# Patient Record
Sex: FEMALE | Race: WHITE
[De-identification: names, ages, dates, MRNs, and addresses within clinical notes are randomized per-mention and may not be internally consistent; named-entity substitution may affect disease eponyms.]

---

## 2018-02-06 ENCOUNTER — HOSPITAL ENCOUNTER (INPATIENT)
Dept: HOSPITAL 92 - L&D | Age: 33
LOS: 1 days | Discharge: HOME | End: 2018-02-07
Attending: FAMILY MEDICINE | Admitting: FAMILY MEDICINE
Payer: COMMERCIAL

## 2018-02-06 VITALS — BODY MASS INDEX: 29.5 KG/M2

## 2018-02-06 DIAGNOSIS — M79.651: ICD-10-CM

## 2018-02-06 DIAGNOSIS — Z3A.39: ICD-10-CM

## 2018-02-06 DIAGNOSIS — M53.3: ICD-10-CM

## 2018-02-06 DIAGNOSIS — M25.551: ICD-10-CM

## 2018-02-06 DIAGNOSIS — O99.89: Primary | ICD-10-CM

## 2018-02-06 LAB
HBSAG INDEX: 0.22 S/CO (ref 0–0.99)
HGB BLD-MCNC: 11.7 G/DL (ref 12–16)
MCH RBC QN AUTO: 30.6 PG (ref 27–31)
MCV RBC AUTO: 95.4 FL (ref 81–99)
PLATELET # BLD AUTO: 173 THOU/UL (ref 130–400)
RBC # BLD AUTO: 3.82 MILL/UL (ref 4.2–5.4)
SYPHILIS ANTIBODY INDEX: 0.06 S/CO
WBC # BLD AUTO: 9.9 THOU/UL (ref 4.8–10.8)

## 2018-02-06 PROCEDURE — 10907ZC DRAINAGE OF AMNIOTIC FLUID, THERAPEUTIC FROM PRODUCTS OF CONCEPTION, VIA NATURAL OR ARTIFICIAL OPENING: ICD-10-PCS | Performed by: FAMILY MEDICINE

## 2018-02-06 PROCEDURE — 86780 TREPONEMA PALLIDUM: CPT

## 2018-02-06 PROCEDURE — 85027 COMPLETE CBC AUTOMATED: CPT

## 2018-02-06 PROCEDURE — S0020 INJECTION, BUPIVICAINE HYDRO: HCPCS

## 2018-02-06 PROCEDURE — 0HQ9XZZ REPAIR PERINEUM SKIN, EXTERNAL APPROACH: ICD-10-PCS | Performed by: FAMILY MEDICINE

## 2018-02-06 PROCEDURE — 51702 INSERT TEMP BLADDER CATH: CPT

## 2018-02-06 PROCEDURE — 87340 HEPATITIS B SURFACE AG IA: CPT

## 2018-02-06 PROCEDURE — 36415 COLL VENOUS BLD VENIPUNCTURE: CPT

## 2018-02-06 RX ADMIN — DOCUSATE CALCIUM SCH MG: 240 CAPSULE, LIQUID FILLED ORAL at 23:32

## 2018-02-07 VITALS — SYSTOLIC BLOOD PRESSURE: 146 MMHG | DIASTOLIC BLOOD PRESSURE: 91 MMHG

## 2018-02-07 VITALS — TEMPERATURE: 97.8 F

## 2018-02-07 RX ADMIN — DOCUSATE CALCIUM SCH MG: 240 CAPSULE, LIQUID FILLED ORAL at 09:23

## 2018-02-07 RX ADMIN — DOCUSATE CALCIUM SCH MG: 240 CAPSULE, LIQUID FILLED ORAL at 21:08

## 2018-02-07 RX ADMIN — HYDROCODONE BITARTRATE AND ACETAMINOPHEN PRN TAB: 5; 325 TABLET ORAL at 02:48

## 2018-02-07 RX ADMIN — HYDROCODONE BITARTRATE AND ACETAMINOPHEN PRN TAB: 5; 325 TABLET ORAL at 09:22

## 2018-02-07 RX ADMIN — HYDROCODONE BITARTRATE AND ACETAMINOPHEN PRN TAB: 5; 325 TABLET ORAL at 21:08

## 2018-02-07 NOTE — OP
DATE OF PROCEDURE:  02/06/2018

 

PREOPERATIVE DIAGNOSES:

1.  Term intrauterine pregnancy.

2.  Severe right hip and thigh pain and induction of labor at term.

 

POSTOPERATIVE DIAGNOSES:

1.  Term intrauterine pregnancy.

2.  Severe right hip and thigh pain and induction of labor at term.

3.  First degree perineal laceration.

 

PROCEDURE PERFORMED:  Normal spontaneous vaginal delivery and laceration repair.

 

SURGEON:  Blank Mckenna M.D.

 

ANESTHESIA:  Epidural.

 

ESTIMATED BLOOD LOSS:  250 mL.

 

BRIEF DELIVERY SUMMARY:  This is a 32-year-old G4, P2-0-2-2 at 39 and 4/7th weeks gestation who prese
nted for induction of labor at term secondary to severe right hip and thigh joint pain.  She arrived 
on Labor and Delivery and was given an epidural, which alleviated her hip and back pain.  After that 
time, we performed artificial rupture of membranes.  At the time of admission, her cervical exam was 
3 to 4 cm, 70% effaced and -1 station.  She progressed quickly to complete and pushing over 3 hours a
nd she delivered a live female infant, head OA.  Mouth and nares were bulb suctioned at the perineum.
  There was a nuchal cord x1, which was easily reduced prior to delivery of the shoulders.  Shoulders
 and body quickly and easily followed and the infant was placed on mother's abdomen.  Infant Apgars w
ere 8 at 1 minute and 9 at 5 minutes.  The umbilical cord was doubly clamped and cut and cord blood w
as collected and sent for analysis.  The placenta delivered spontaneously and intact with a 3-vessel 
umbilical cord.  Uterine fundus was firm following evacuation of the placenta.  Dutta catheter was re
placed.  There was a first degree perineal laceration, which was repaired in standard running fashion
 using 2-0 Vicryl suture under epidural anesthesia with excellent hemostasis.  Mom and baby were left
 with the nurse in excellent condition attempting to breast feed.

## 2018-02-11 ENCOUNTER — HOSPITAL ENCOUNTER (INPATIENT)
Dept: HOSPITAL 92 - ERS | Age: 33
LOS: 4 days | Discharge: HOME | DRG: 776 | End: 2018-02-15
Attending: FAMILY MEDICINE | Admitting: FAMILY MEDICINE
Payer: COMMERCIAL

## 2018-02-11 VITALS — BODY MASS INDEX: 28 KG/M2

## 2018-02-11 DIAGNOSIS — C79.51: ICD-10-CM

## 2018-02-11 DIAGNOSIS — E83.52: ICD-10-CM

## 2018-02-11 DIAGNOSIS — C77.2: ICD-10-CM

## 2018-02-11 DIAGNOSIS — R18.8: ICD-10-CM

## 2018-02-11 DIAGNOSIS — E87.1: ICD-10-CM

## 2018-02-11 DIAGNOSIS — C50.412: ICD-10-CM

## 2018-02-11 DIAGNOSIS — C78.7: ICD-10-CM

## 2018-02-11 LAB
ALBUMIN SERPL BCG-MCNC: 2.6 G/DL (ref 3.5–5)
ALP SERPL-CCNC: 669 U/L (ref 40–150)
ALT SERPL W P-5'-P-CCNC: 103 U/L (ref 8–55)
ANION GAP SERPL CALC-SCNC: 16 MMOL/L (ref 10–20)
APAP SERPL-MCNC: (no result) MCG/ML (ref 10–30)
AST SERPL-CCNC: 361 U/L (ref 5–34)
BASOPHILS # BLD AUTO: 0.1 THOU/UL (ref 0–0.2)
BASOPHILS NFR BLD AUTO: 0.8 % (ref 0–1)
BILIRUB SERPL-MCNC: 1.5 MG/DL (ref 0.2–1.2)
BUN SERPL-MCNC: 11 MG/DL (ref 7–18.7)
CALCIUM SERPL-MCNC: 11.2 MG/DL (ref 7.8–10.44)
CHLORIDE SERPL-SCNC: 102 MMOL/L (ref 98–107)
CO2 SERPL-SCNC: 21 MMOL/L (ref 22–29)
CREAT CL PREDICTED SERPL C-G-VRATE: 0 ML/MIN (ref 70–130)
DRUG SCREEN CUTOFF: (no result)
EOSINOPHIL # BLD AUTO: 0 THOU/UL (ref 0–0.7)
EOSINOPHIL NFR BLD AUTO: 0.4 % (ref 0–10)
GLOBULIN SER CALC-MCNC: 3.5 G/DL (ref 2.4–3.5)
GLUCOSE SERPL-MCNC: 87 MG/DL (ref 70–105)
HGB BLD-MCNC: 13.3 G/DL (ref 12–16)
LYMPHOCYTES # BLD: 1.4 THOU/UL (ref 1.2–3.4)
LYMPHOCYTES NFR BLD AUTO: 13.7 % (ref 21–51)
MCH RBC QN AUTO: 30.6 PG (ref 27–31)
MCV RBC AUTO: 95.6 FL (ref 81–99)
MEDTOX CONTROL LINE VALID?: (no result)
MEDTOX READER #: (no result)
MONOCYTES # BLD AUTO: 0.6 THOU/UL (ref 0.11–0.59)
MONOCYTES NFR BLD AUTO: 5.7 % (ref 0–10)
NEUTROPHILS # BLD AUTO: 8.2 THOU/UL (ref 1.4–6.5)
NEUTROPHILS NFR BLD AUTO: 79.5 % (ref 42–75)
PLATELET # BLD AUTO: 202 THOU/UL (ref 130–400)
POTASSIUM SERPL-SCNC: 4.1 MMOL/L (ref 3.5–5.1)
RBC # BLD AUTO: 4.34 MILL/UL (ref 4.2–5.4)
SALICYLATES SERPL-MCNC: (no result) MG/DL (ref 15–30)
SODIUM SERPL-SCNC: 135 MMOL/L (ref 136–145)
SP GR UR STRIP: 1.01 (ref 1–1.04)
WBC # BLD AUTO: 10.3 THOU/UL (ref 4.8–10.8)

## 2018-02-11 PROCEDURE — 96361 HYDRATE IV INFUSION ADD-ON: CPT

## 2018-02-11 PROCEDURE — 93970 EXTREMITY STUDY: CPT

## 2018-02-11 PROCEDURE — 51702 INSERT TEMP BLADDER CATH: CPT

## 2018-02-11 PROCEDURE — 85610 PROTHROMBIN TIME: CPT

## 2018-02-11 PROCEDURE — 72170 X-RAY EXAM OF PELVIS: CPT

## 2018-02-11 PROCEDURE — 96365 THER/PROPH/DIAG IV INF INIT: CPT

## 2018-02-11 PROCEDURE — 72100 X-RAY EXAM L-S SPINE 2/3 VWS: CPT

## 2018-02-11 PROCEDURE — 77290 THER RAD SIMULAJ FIELD CPLX: CPT

## 2018-02-11 PROCEDURE — 88331 PATH CONSLTJ SURG 1 BLK 1SPC: CPT

## 2018-02-11 PROCEDURE — 77014: CPT

## 2018-02-11 PROCEDURE — 72158 MRI LUMBAR SPINE W/O & W/DYE: CPT

## 2018-02-11 PROCEDURE — 78306 BONE IMAGING WHOLE BODY: CPT

## 2018-02-11 PROCEDURE — 88341 IMHCHEM/IMCYTCHM EA ADD ANTB: CPT

## 2018-02-11 PROCEDURE — 80307 DRUG TEST PRSMV CHEM ANLYZR: CPT

## 2018-02-11 PROCEDURE — 88342 IMHCHEM/IMCYTCHM 1ST ANTB: CPT

## 2018-02-11 PROCEDURE — 70552 MRI BRAIN STEM W/DYE: CPT

## 2018-02-11 PROCEDURE — 93306 TTE W/DOPPLER COMPLETE: CPT

## 2018-02-11 PROCEDURE — 72197 MRI PELVIS W/O & W/DYE: CPT

## 2018-02-11 PROCEDURE — 80053 COMPREHEN METABOLIC PANEL: CPT

## 2018-02-11 PROCEDURE — 71260 CT THORAX DX C+: CPT

## 2018-02-11 PROCEDURE — 83735 ASSAY OF MAGNESIUM: CPT

## 2018-02-11 PROCEDURE — 81003 URINALYSIS AUTO W/O SCOPE: CPT

## 2018-02-11 PROCEDURE — 93005 ELECTROCARDIOGRAM TRACING: CPT

## 2018-02-11 PROCEDURE — 70551 MRI BRAIN STEM W/O DYE: CPT

## 2018-02-11 PROCEDURE — A4216 STERILE WATER/SALINE, 10 ML: HCPCS

## 2018-02-11 PROCEDURE — 85730 THROMBOPLASTIN TIME PARTIAL: CPT

## 2018-02-11 PROCEDURE — 94760 N-INVAS EAR/PLS OXIMETRY 1: CPT

## 2018-02-11 PROCEDURE — 36415 COLL VENOUS BLD VENIPUNCTURE: CPT

## 2018-02-11 PROCEDURE — 77334 RADIATION TREATMENT AID(S): CPT

## 2018-02-11 PROCEDURE — 74177 CT ABD & PELVIS W/CONTRAST: CPT

## 2018-02-11 PROCEDURE — 77307 TELETHX ISODOSE PLAN CPLX: CPT

## 2018-02-11 PROCEDURE — 88334 PATH CONSLTJ SURG CYTO XM EA: CPT

## 2018-02-11 PROCEDURE — 88305 TISSUE EXAM BY PATHOLOGIST: CPT

## 2018-02-11 PROCEDURE — 70450 CT HEAD/BRAIN W/O DYE: CPT

## 2018-02-11 PROCEDURE — 85025 COMPLETE CBC W/AUTO DIFF WBC: CPT

## 2018-02-11 PROCEDURE — 80306 DRUG TEST PRSMV INSTRMNT: CPT

## 2018-02-11 PROCEDURE — A9503 TC99M MEDRONATE: HCPCS

## 2018-02-11 RX ADMIN — MAGNESIUM SULFATE HEPTAHYDRATE SCH MLS: 40 INJECTION, SOLUTION INTRAVENOUS at 15:40

## 2018-02-11 NOTE — PDOC.EVN
Event Note





- Event Note


Event Note: 


@1630: MRI compatible with PRES. BP better after IV labetolol

## 2018-02-11 NOTE — PDOC.LDHP
Labor and Delivery H&P


Chief complaint: other (5 days postpartum seizure activity)


HPI: 


33 yo white   X 3, last one was 5 days ago with Dr Mckenna, SAB x2 (one D&C

) here for seizure witnessed by EMS. Given Versed 5mg x 1. In ER now bed4. I 

have seen the patient (slightly post-ictal)and interviewed the  at 

bedside. Dr Mckenna aware of patient. No meds except hydrocodone for hip pain.





Allergies: NONE





Surg: D&C X1





ROS: not available due to patient sedation





BP now 130/100





Hread CT done...MRI pending





Labs pending (see event note from 20 minutes prior)


Grav: 5


Para: 3 (SAB 2)


Current pregnancy complications: none


Allergies/Adverse Reactions: 


 Allergies











Allergy/AdvReac Type Severity Reaction Status Date / Time


 


No Known Drug Allergies Allergy   Verified 09/11/15 23:06














- Physical Exam


Abnormal vital signs: 130/100





- Plan


Plan: admit to L&D, magnesium for seizure prophylaxis (We will continue workup 

in ED. Once MRI done, transfer to L&D. Mag 6gram load in use right now. Working 

DX is Eclampsia. Plan D/W ER team.)

## 2018-02-11 NOTE — PDOC.EVN
Event Note





- Event Note


Event Note: 


2330: Bed check: Mag in use. Patient resting, no new issues. BPs 120/80s. Has 

not required any further labetolol.

## 2018-02-11 NOTE — PDOC.EVN
Event Note





- Event Note


Event Note: 


L&D @ 1540:





Patient now in L&D with a HA. IV mag in use. SCDs on for VTE prophylaxis. I 

have just ordered 10mg IN labetolol for BP lowering. CT with possible PRES 

findings vs other. MRI done and report pending. Pain meds prn. 





Labs with normal CR, normal BCB.  and ...supports findings of 

ECLAMPSIA.

## 2018-02-11 NOTE — MRI
MRI BRAIN NONCONTRAST:

 

DATE:

2-11-18

 

HISTORY:

32-year-old post-partum female with seizures. 

 

COMPARISON:

None.

 

FINDINGS:

There are multifocal small patchy T2 hyperintensities consistent with edema scattered in the bilatera
l occipital lobes, upper paramedian parietal lobes, and upper frontal lobes, involving cortex, subcor
tical white matter, and deep white matter. Some of the lesions involve the corpus callosum, including
 several tiny ones in the body, and a moderate sized one in the splenium of the corpus callosum, cent
ered to the right of midline. Several are present in the corona radiata and centrum semiovale. Many m
ore lesions are visible on the MRI compared to the CT. None of these have restricted diffusion or hem
orrhage. The ventricles are normal in size and configuration. There is no mass effect, midline shift,
 or extraaxial fluid collection. 

 

IMPRESSION:

1. Numerous small patchy foci of edema in the bilateral cerebral brain parenchyma. 

2. Given the recent post-partum status, this probably represents PRES (posterior reversible encephalo
deyvi syndrome). 

3. Follow up is recommended. 

 

 

MADAY BENAVIDES

 

POS: [facility]

## 2018-02-11 NOTE — CT
NONCONTRAST CT HEAD:

 

Date: 2-11-18

 

History: Post-partum four days ago, patient had seizure for 34 minutes. 

 

Comparison: None available. 

 

FINDINGS: 

There are low density areas seen in a parafalcine location in the supraventricular region with sugges
tion of similar density area within the corpus callosum posteriorly. There is a questioned low densit
y area seen posteriorly within the right parietal occipital region as well. Exact etiology for these 
areas of decreased attenuation are uncertain. Findings could potentially be related to posterior reve
rsible encephalopathy syndrome (PRES). Areas of infarction related to an embolic phenomenon is a poss
ibility. 

 

There is no mass effect or midline shift. No intraparenchymal or extraaxial hemorrhage is seen. The v
entricular system is normal in size, shape, and position. 

 

The paranasal sinuses and mastoid air cells are clear. Calvarial structures are intact. 

 

IMPRESSION: 

1. Multifocal low density areas seen in a paramedian/parafalcine supraventricular location, more prom
inent on the right with low density area also seen within the posterior right parietooccipital lobe. 
There are questionable low density areas in each bifrontal lobe, but these areas could potentially be
 related to volume averaging with a sulcus. Ffindings may be related to posterior reversible encephal
opathy syndrome (PRES) as stated above, versus embolic phenomenon with small areas of infarction. 

2. MRI of brain is recommended for further evaluation with and without IV contrast. These findings we
re discussed with Dr. Nino in the Emergency Department by Dr. Rendon on 2-11-18 at 1311 hours.  

 

POS: [facility]

## 2018-02-11 NOTE — PDOC.EVN
Event Note





- Event Note


Event Note: 


ER call  to em at 1250:





I just received a call from the PA in the ER. She has assumed care of Ms Moran 

who just arrived to the ED. Per EMS, she had a "seizure" and just arrived to 

our ER. She delivered 5 days ago by Dr Mckenna, controlled , which was induced 

for chronic hip pain but not HTN. She does not report any HA or visual changes 

in these last few days since delivery. Her BP in ER was 140/90s, but not 

severe. 





I have requested she get 6 grams IV Mag then 2 grams per hour. She needs CMP/CBC

/Urine protien.





 Due to atypical presentation, I have requested a head CT to rule out any other 

intracranial issues. Patient received versed 5mg per EMS.





 I have notified Dr Mckenna and am awaiting her response. Will bring up to L&D 

when the ER eval is complete. Working DX: 5 days postpartum, eclampsia.

## 2018-02-12 LAB
ALBUMIN SERPL BCG-MCNC: 2.6 G/DL (ref 3.5–5)
ALP SERPL-CCNC: 736 U/L (ref 40–150)
ALT SERPL W P-5'-P-CCNC: 122 U/L (ref 8–55)
ANION GAP SERPL CALC-SCNC: 15 MMOL/L (ref 10–20)
AST SERPL-CCNC: 437 U/L (ref 5–34)
BILIRUB SERPL-MCNC: 1.5 MG/DL (ref 0.2–1.2)
BUN SERPL-MCNC: 11 MG/DL (ref 7–18.7)
CALCIUM SERPL-MCNC: 9.5 MG/DL (ref 7.8–10.44)
CHLORIDE SERPL-SCNC: 103 MMOL/L (ref 98–107)
CO2 SERPL-SCNC: 22 MMOL/L (ref 22–29)
CREAT CL PREDICTED SERPL C-G-VRATE: 145 ML/MIN (ref 70–130)
GLOBULIN SER CALC-MCNC: 3.4 G/DL (ref 2.4–3.5)
GLUCOSE SERPL-MCNC: 69 MG/DL (ref 70–105)
POTASSIUM SERPL-SCNC: 4.5 MMOL/L (ref 3.5–5.1)
SODIUM SERPL-SCNC: 135 MMOL/L (ref 136–145)

## 2018-02-12 RX ADMIN — MAGNESIUM SULFATE HEPTAHYDRATE SCH MLS: 40 INJECTION, SOLUTION INTRAVENOUS at 02:08

## 2018-02-12 RX ADMIN — MAGNESIUM SULFATE HEPTAHYDRATE SCH MLS: 40 INJECTION, SOLUTION INTRAVENOUS at 11:41

## 2018-02-12 RX ADMIN — DOCUSATE CALCIUM SCH: 240 CAPSULE, LIQUID FILLED ORAL at 23:17

## 2018-02-12 NOTE — PDOC.EVN
Event Note





- Event Note


Event Note: 


L&D check: Patient seen at 0640 at bedside:


Doing well, no HA or other issues





BPs 120/80s, good UOP


Mag in use until 1330 or so


Repeat CMP with persistent elevated LFTS: , 





Left breast mild mastitis: on Diclox 500mg po QID





A/P: S/P eclampsia with residual elevated LFTs. Continue MG until 1330 today, 

good diuresis and no edema. Dicolx for breast. Lactation consultant shauna done 

yesterday.

## 2018-02-12 NOTE — PDOC.EVN
Event Note





- Event Note


Event Note: 


@0300: left breast erythema...breastfeeding. No fever...will begin 

Dicloxacillin for presumed early mastitis.

## 2018-02-12 NOTE — RAD
AP PELVIS:

 

Date: 2-12-18 

 

History: Low back pain, right hip pain. 

 

FINDINGS: 

There is no evidence of a fracture or dislocation. There is suggestion of lucency involving the left 
inferior pubic ramus with expansion of pubic ramus which is asymmetric compared to the right inferior
 pubic ramus. This may be developmental in origin in the lucency related to overlying densities, a le
kathy in this region cannot be entirely excluded. No other lytic or sclerotic osseous lesions are seen
 involving the pelvis. Minimal degenerative change involving the pubic symphysis. Phleboliths overlie
 the pelvis. 

 

IMPRESSION: 

1. Asymmetry in the left inferior pubic ramus compared to the right with question of expansion and archie
cency within the left inferior pubic ramus. This may be projectional, but further evaluation is recom
mended to exclude the possibility of a lesion in this region. Dedicated views of the left hip are sug
gested. 

 

Code T

 

POS: [facility]

## 2018-02-12 NOTE — PRG
DATE OF SERVICE:  02/12/2018

 

SUBJECTIVE:  The patient continues to complain of right low back and hip pain radiating into the thig
h down to about the level of the knee and this is only modestly improved from delivery.  Furthermore,
 she is complaining of mental fog and feeling very drowsy.  She denies any headache and states that s
he does feel somewhat better than yesterday when she presented following an eclamptic seizure.

 

OBJECTIVE:

VITAL SIGNS:  Blood pressures are 130s to 140s over 90s, pulse is in the 80s, respirations are 16, an
d O2 sat is 96% on room air.

GENERAL:  This is a well-developed, well-nourished female in no apparent distress, although she does 
appear moderately uncomfortable in the bed.

HEENT:  Unremarkable.

HEART:  Regular rate and rhythm with no murmurs.

LUNGS:  Clear to auscultation bilaterally.

ABDOMEN:  Soft, nontender with normoactive bowel sounds.  There is a Dutta catheter in place draining
 clear urine.

EXTREMITIES:  Show no clubbing, cyanosis, or edema.

NEUROLOGIC:  Nonfocal, reflexes are 2+ throughout.

 

LABORATORY DATA:  CBC done on admission was normal with a platelet count of 202.  She did have mildly
 elevated LFTs yesterday.  These continue to be elevated with an AST of 437 and an ALT of 122.  Compr
ehensive metabolic panel is otherwise normal today.  Urinalysis is negative for protein.  Urine toxic
ology was negative.  MRI of the brain showed reversible encephalopathy related to elevated blood pres
sure.  This supports the idea eclampsia as the diagnosis.

 

ASSESSMENT AND PLAN:  This is a 32-year-old G5, P3-0-2-3, 6 days postpartum who presented with an ecl
amptic seizure.

1.  Eclampsia.  She was started on magnesium at 1540 hours on 02/11/2018, we will continue this for 2
4 hours.  She has had good urine output and improvement in her blood pressure.  She did receive 1 dos
e of labetalol 10 mg IV yesterday at 1545; however, she has not required any additional blood pressur
e lowering medications.  We did discuss that she would probably need to go home on some kind of oral 
blood pressure medication, although exactly what that will be remains to be seen.  Looking back at he
r last admission when she was present for delivery, her pressures were largely normal.  She did have 
one significantly elevated pressure just prior to discharge that I was not aware of, this was noted b
y the nurse that the patient was in pain.  She was given pain medication and the blood pressure came 
down substantially, so the nurse felt that she could be discharged under my orders.  Looking back, it
 is possible that was an early sign of what was to come, but again it responded really well to just m
anagement of her pain.

2.  Significant back pain and leg pain.  Dr. Huang has actually seen the patient, both outpatient and
 during this hospital stay.  X-rays have been ordered to look at the low back, the pelvis and the rig
ht hip.  If those are normal, the next step would be MRI of that area.  For now, we will just continu
e with narcotic pain relief, which does seem to be taking the edge off.

3.  Breast redness.  It is unclear if this represents true mastitis; however, the patient was started
 on dicloxacillin by the OB Hospitalist and we will continue that for the time being.  She does not h
ave a fever or any other signs of acute infection and just some redness and slight uncomfortable feel
ing in both breasts.  She is also pumping breast milk for her infant and the infant will come visit h
er today as well.

4.  We will repeat her comprehensive metabolic panel tomorrow to watch the trend of her LFTs.

5.  Disposition should be home within a couple of days provided we can keep her blood pressure under 
good control.

## 2018-02-12 NOTE — CON
DATE OF CONSULTATION:  02/12/2018

 

ATTENDING PHYSICIAN:  Dr. Blank Mckenna

 

HISTORY OF PRESENT ILLNESS:  The patient is a 32-year-old white female who just delivered her third c
hild last week.  I saw her a few weeks ago with a several week history of progressive pain in her rig
ht buttock and right hip area which was thought to be secondary to pregnancy and perhaps pressure on 
her lumbosacral plexus.  She was treated with rest, Medrol Dosepak, and p.r.n. hydrocodone without mu
ch improvement.  It was felt that this would resolve after delivery, but she has continued to have sy
mptoms.  Over this past weekend she noticed some numbness on the sole of the right foot.  Yesterday s
he had 2 witnessed seizures, which  has been thought to be secondary to eclampsia and was admitted fo
r this and is now on magnesium IV.  She is stable from this standpoint, but still has had a low back 
and right hip and leg pain.

 

PAST HISTORY:  As noted above.  The patient is otherwise in good health.

 

PHYSICAL EXAMINATION:

GENERAL:  Reveals a healthy female.  She is alert and oriented.

EXTREMITIES:  Pertinent findings related to her back and lower extremities.  There is tenderness in l
umbosacral junction and in the right buttock area.  No definite groin tenderness.  There is some ques
tionable pain with extremes of motion of her right hip.  Straight leg raising causes back and buttock
 pain on the right at approximately 70 degrees and is negative on the left.

Motor exam is intact.  There is some subjective numbness on the palmar aspect of the right foot.  

 

Previous x-ray diagnostic studies were not performed previously because of her pregnancy.

 

IMPRESSION:  Low back and right hip pain, possible radiculopathy.

 

PLAN:  Will obtain x-rays of her back, pelvis and right hip.  If these did not show any obvious patho
logy she will go for MRI scanning of her lumbar spine and/or hip.  In the meantime, we will continue 
supportive and symptomatic care.  

 

Thank you for allowing me to see this patient.  I will follow with you.

## 2018-02-12 NOTE — RAD
TWO VIEWS LUMBAR SPINE:

 

Date: 2-12-18 

 

History: Low back pain, right hip pain. 

 

FINDINGS: 

There are five non-rib bearing lumbar type vertebral bodies. The vertebral body heights are within no
rmal limits. There is narrowing of the L3-4 intervertebral disc space. No fracture or subluxation is 
seen. No other findings. 

 

IMPRESSION: 

1. No acute fracture or subluxation involving the lumbar spine. 

2. Narrowing of the intervertebral disc space at the L3-4 level.

 

POS: [facility]

## 2018-02-12 NOTE — RAD
TWO VIEWS RIGHT HIP:

 

Date: 2-12-18 

 

History: Low back pain, right hip pain. 

 

FINDINGS: 

There is no evidence of a fracture or dislocation. No other osseous abnormality is seen. 

 

IMPRESSION: 

No acute osseous abnormality right hip. 

 

POS: [facility]

## 2018-02-13 LAB
ALBUMIN SERPL BCG-MCNC: 2.3 G/DL (ref 3.5–5)
ALP SERPL-CCNC: 710 U/L (ref 40–150)
ALT SERPL W P-5'-P-CCNC: 112 U/L (ref 8–55)
ANION GAP SERPL CALC-SCNC: 9 MMOL/L (ref 10–20)
AST SERPL-CCNC: 419 U/L (ref 5–34)
BILIRUB SERPL-MCNC: 1.6 MG/DL (ref 0.2–1.2)
BUN SERPL-MCNC: 15 MG/DL (ref 7–18.7)
CALCIUM SERPL-MCNC: 9.8 MG/DL (ref 7.8–10.44)
CHLORIDE SERPL-SCNC: 98 MMOL/L (ref 98–107)
CO2 SERPL-SCNC: 27 MMOL/L (ref 22–29)
CREAT CL PREDICTED SERPL C-G-VRATE: 125 ML/MIN (ref 70–130)
GLOBULIN SER CALC-MCNC: 3 G/DL (ref 2.4–3.5)
GLUCOSE SERPL-MCNC: 82 MG/DL (ref 70–105)
POTASSIUM SERPL-SCNC: 4.1 MMOL/L (ref 3.5–5.1)
SODIUM SERPL-SCNC: 130 MMOL/L (ref 136–145)

## 2018-02-13 RX ADMIN — MORPHINE SULFATE PRN MG: 5 INJECTION, SOLUTION INTRAMUSCULAR; INTRAVENOUS at 19:01

## 2018-02-13 RX ADMIN — Medication PRN ML: at 19:01

## 2018-02-13 RX ADMIN — Medication SCH ML: at 21:33

## 2018-02-13 RX ADMIN — NIFEDIPINE SCH MG: 30 TABLET, FILM COATED, EXTENDED RELEASE ORAL at 09:50

## 2018-02-13 RX ADMIN — DOCUSATE CALCIUM SCH MG: 240 CAPSULE, LIQUID FILLED ORAL at 21:32

## 2018-02-13 RX ADMIN — DOCUSATE CALCIUM SCH: 240 CAPSULE, LIQUID FILLED ORAL at 09:51

## 2018-02-13 RX ADMIN — Medication SCH ML: at 09:50

## 2018-02-13 RX ADMIN — Medication SCH ML: at 14:58

## 2018-02-13 NOTE — MRI
MRI LUMBAR SPINE WITH AND WITHOUT CONTRAST:

 

HISTORY: 

Back pain.

 

COMPARISON: 

Radiographs.

 

FINDINGS: 

There is extensive retroperitoneal adenopathy.  Abnormal T2 hyperintense enhancing lesions within the
 liver.

 

No hydronephrosis.  There is free fluid in the pelvis.

 

Abnormal enhancing masses within L1, L2, L3, and L4 vertebrae.  There is also enhancing mass within t
he S1 with anterior cortical breakthrough.  There is posterior extension of tumor at L3 with effaceme
nt of the posterior longitudinal ligament.  This narrows the spinal canal to approximately 4 mm.  

 

There is involvement of the transverse processes of L5 bilaterally.  

 

No significant neural foraminal narrowing.  

 

IMPRESSION: 

1.      Extensive osseous metastatic disease throughout the lumbar spine involving L1-2-3-4 as well a
s the L5 vertebral body and the transverse processes.  There is also involvement of the S1 vertebral 
body with anterior cortical breakthrough.

2.  Posterior cortical breakthrough with effacement of the epidural space at L3 narrowing the spinal 
canal to approximately 4 mm.

3.  Extensive abnormal signal throughout the liver suggesting metastatic disease.

4.  Extensive retroperitoneal adenopathy.

5. Abnormal soft tissue enhancement along the right S1 nerve root.

6.  Oncologic consultation recommended.  Chest, abdomen, and pelvis CT for staging is recommended.

 

Dr. Rubén Huang notified of the findings via telephone at approximately 12:00 p.m.

 

CODE CR

 

POS: [facility]

## 2018-02-13 NOTE — CT
CT CHEST WITH CONTRAST

CT ABDOMEN WITH CONTRAST

CT PELVIS WITH CONTRAST

2/13/18

 

HISTORY: 

Abnormal MRI. Metastatic lesions. Evaluate for primary.

 

COMPARISON:  

MRI pelvis and lumbar spine 2/13/18. 

 

FINDINGS:  

Severe thickening of the left breast. Multiple left sided breast masses. Multiple enlarged left axill
alma lymph nodes. Hyperdense breast mass on the left. 

 

There is a right perifissural lung nodule measuring up to 6 mm. No pneumothorax. There is a moderate 
sided left pleural effusion.

 

No pericardial effusion. Diffuse hepatic metastatic disease. Large volume ascites. Recent postpartum 
uterus. Dilatation of the gonadal veins. There is retroperitoneal adenopathy. 

 

There is lytic focus of the L3 vertebra with epidural extension of tumor. There is also a lytic focus
 of sacrum and S1. Lytic mass is present in L2 as well as T9 and T6 vertebrae. There is a lytic lesio
n in the right third rib. There is also lytic anterior left fourth rib lesion. Lytic lesion in the po
sterior left fifth rib. There is a lytic lesion in the right glenoid.

 

There are lytic foci of the right superior and inferior pubic rami, left inferior pubic ramus, pathol
ogic fracture. There is a pathologic fracture right superior and inferior pubic rami. There is a larg
e lytic mass of the sacrum bilaterally as well as acetabular lesions and ilium lesions. 

 

IMPRESSION:  

1.      Extensive thickening of skin over the left breast with multiple hyperdense breast masses conc
erning for malignant process as well as axillary lymphadenopathy. 

2.      Near complete replacement of the liver parenchyma with metastatic foci. 

3.      Extensive retroperitoneal adenopathy shows some metastatic disease. 

4.      Extensive axial and appendicular metastatic disease with epidural extension of tumor at L3. T
here are also pathologic fractures of the superior and inferior pubic rami. Tumor involves the right 
S1 nerve root. 

 

 

CODE: SYD Mckenna

 

POS: [facility]

## 2018-02-13 NOTE — MRI
MRI PELVIS WITH AND WITHOUT CONTRAST:

 

HISTORY:

Pain.

 

COMPARISON:

Radiographs from 02/12/2018.

 

FINDINGS:

There is free fluid in the pelvis.  Recent post partum uterus.

 

There is extensive edema throughout the adductor as well as obturator musculature at the pelvis.  The
 is an abnormal enhancing mass in the sacrum, as well as in the ilium, bilaterally, left acetabulum, 
the right intertrochanteric portion of the femur, the right ischium, and the left ischium, as well as
 both pubic bodies and the right superior pubic ramus.  A pathologic fracture is noted of the right s
uperior pubic ramus.  There is also a soft tissue enhancing tumor breaking through the left inferior 
pubic ramus pathologic fracture, involving the adductor muscles.

 

IMPRESSION:

Extensive osseous metastatic disease with pathologic fractures of the right superior and left inferio
r pubic rami, with soft tissue extension outside the cortices and periosteum, extending into the magalys
cent musculature.

 

Dr. Huang was notified of the findings, via telephone, at around 12:00 p.m.

 

An oncologic consultation is recommended.

 

A follow-up chest, abdomen, and pelvis CT for further evaluation is recommended.

 

CODE CR

 

POS: [facility]

## 2018-02-13 NOTE — PRG
DATE OF SERVICE:  02/13/2018

 

PRIMARY OB:  Dr. Blank Mckenna.

 

SUBJECTIVE:  The patient is a 32-year-old female who was admitted to labor and delivery, postpartum d
ay #5 for eclampsia and was placed on 24 hours of magnesium.  The patient is noted to have elevated l
iver enzymes on arrival.  The patient is now about 18 hours off of magnesium and denies headaches or 
right upper quadrant tenderness or shortness of breath.  The patient does have a musculoskeletal issu
e in her lower back and hip and leg, this is being evaluated by Orthopedics and has been diagnosed wi
th mastitis, placed on dicloxacillin by her primary OB, Dr. Blank Mckenna.  Patient otherwise reports a
ppropriate lochia.  She is tolerating a diet, has some difficulty ambulating due to this musculoskele
chikis issue, is voiding on her own.  Her blood pressures over the last 24 hours have ranged from 138/89
-165/96.

 

OBJECTIVE:

GENERAL:  She appears to be in no acute distress.  She is alert and oriented, and cooperative and ple
asant to interact with.

ABDOMEN:  Soft.

EXTREMITIES:  Nontender, nonedematous.

 

LABORATORY DATA:  Labs this morning, her AST is beginning to fall, 419 down from 437, ALT is down to 
112 from 122, alkaline phosphatase is down to 710 from 736.

 

ASSESSMENT AND PLAN:  The patient is a 32-year-old female status post eclampsia, in magnesium.  It ap
pears she has had 1 severe range of pressure yesterday afternoon.  All others have been in the mild r
oralia.  We will continue to watch her blood pressures today.  If she spikes again in the severe range,
 we will recommend starting blood pressure medication.

## 2018-02-13 NOTE — CON
DATE OF CONSULTATION:  02/13/2018

 

REASON FOR CONSULTATION:  Metastatic lesions.

 

HISTORY OF PRESENT ILLNESS:  Ms. Moran is a 32-year-old  female who 
was admitted to labor and delivery on postpartum day #5 for eclampsia.  She 
presented with headaches and right hip pain.  She has been treated with IV 
magnesium and blood pressure medicine.  The patient has been struggling with 
hip pain since approximately mid-pregnancy.  She did see Dr. Huang in the 
outpatient setting.  X-rays of the area showed no areas of concern.  Her pain 
increased substantially approximately 2 weeks prior to delivery, to the point 
where she is now using a walker with ambulation.  The patient has also had some 
left breast tenderness as far back as the beginning of pregnancy.  She did have 
an ultrasound several months ago which was unremarkable.  Apparently two days 
ago, she had a seizure and was taken by ambulance to the ER.  She had a brain CT
, which showed multifocal low density areas in the paramedian, parafalcine and 
supraventricular area.  I felt this is due to press syndrome.  She had an MRI 
of the brain without contrast, which showed again small patchy foci of edema.  
She has had no seizures since admission.  She had a lumbar MRI this morning 
which showed abnormal enhancing masses within L1, L2, L3 and L4 vertebra.  
There was an enhancing mass with S1.  There was narrowing of the spinal canal 
at L3.  There was abnormal signal throughout the liver suggestive of metastatic 
disease.  She had extensive retroperitoneal adenopathy.  This was confirmed 
with a pelvic MRI.  The patient's pregnancy has been unremarkable except for 
the right hip pain and difficulty ambulating.  She has not had any significant 
nausea or weight loss.  She does admit to having night sweats, particularly 
after delivery.  No itching or rash.

 

PAST MEDICAL HISTORY:  None.

 

PAST SURGICAL HISTORY:  D and C x1.

 

ALLERGIES:  No known drug allergies.

 

HOME MEDICATIONS:

1.  Aspirin 81 mg daily.

2.  Hydrocodone p.r.n.

3.  Prenatal vitamin daily.

4.  Motrin p.r.n.

 

FAMILY HISTORY:  Grandmother had possible ovarian cancer, but she is not sure.  
Her mother has no history of cancer, unknown oncological history on her father'
s side.

 

SOCIAL HISTORY:  She is  and has 3 children and is postpartum day #5.  
No alcohol, tobacco or illicit drug use.

 

REVIEW OF SYSTEMS:  Constitutional:  No fever, chills.  Positive for night 
sweats.  Eyes:  No blurred or double vision.  ENT:  No pain, hoarseness, sore 
throat, or dysphagia.  Cardiovascular:  No chest pain, palpitations or syncope.
  Respiratory:  No shortness breath, dyspnea on exertion or orthopnea.  
Gastrointestinal:  Positive for nausea, no vomiting, diarrhea, constipation or 
abdominal pain.  Genitourinary:  No dysuria or hematuria.  Musculoskeletal:  
Positive for right hip and back pain.  Skin:  Positive for rash on her left 
breast.  Neurological:  Positive for right lower extremity weakness.  
Psychiatric:  The patient denies anxiety or depression.

 

PHYSICAL EXAMINATION:

VITAL SIGNS:  Temperature is 98.0, pulse is 84, respiratory rate 18, BP is 137/
92.

GENERAL:  Well-developed, well-nourished female, in no acute distress.

HEENT:  Normocephalic, atraumatic.  Pupils equal and reactive to light.

NECK:  Supple.

CARDIOVASCULAR:  Regular rate and rhythm.

LUNGS:  Clear.

ABDOMEN:  Firm, distended, postpartal.

EXTREMITIES.  No clubbing, cyanosis or edema.

BREASTS:  Her left breast has patchy erythematous skin changes with multiple 
palpable areas lesions with well-defined borders.  Her right breast is soft 
with no skin or nipple changes.

NEUROLOGICAL:  Nonfocal.

PSYCHIATRIC:  The patient is alert and oriented and answers questions 
appropriately.

 

PERTINENT LABORATORY AND X-RAYS:  Current WBCs are 10.3, hemoglobin 13.3, 
hematocrit 41.5, platelet count is 202,000.  She has got 80% neutrophils, 14% 
lymphocytes, 6% monocytes.  Sodium is 130, potassium 4.1, chloride 98, CO2 is 27
, BUN is 15, creatinine 0.74, calcium is 9.8, total bilirubin is 1.6, AST is 419
, ALT is 112, alkaline phosphatase is 710, serum total protein is 5.3, albumin 
2.3, globulin 3.0.  Urine is negative for bacteria.  Radiology per HPI.

 

ASSESSMENT:

1.  Metastatic disease with bone and liver involvement, likely breast lesions.

2.  Multi-foci areas in brain. PRES vs metastatic lesions

3.  Hypercalcemia.

4.  Postpartum day #5, delivery of a healthy infant.

 

PLAN:  The case has been discussed with Dr. Murphy who was discussed case with 
Dr. Mckenna.  The patient will have a CT scan of her chest, abdomen, and pelvis 
with contrast to complete staging.  I recommend a brain MRI with contrast to 
further evaluate these areas of edema.  She needs a tissue biopsy, possibly of 
the liver tomorrow.  Further recommendations will be based on these results.

 

[facility]

## 2018-02-14 LAB
ALBUMIN SERPL BCG-MCNC: 2.5 G/DL (ref 3.5–5)
ALP SERPL-CCNC: 732 U/L (ref 40–150)
ALT SERPL W P-5'-P-CCNC: 117 U/L (ref 8–55)
ANION GAP SERPL CALC-SCNC: 12 MMOL/L (ref 10–20)
APTT PPP: 37.7 SEC (ref 22.9–36.1)
AST SERPL-CCNC: 427 U/L (ref 5–34)
BASOPHILS # BLD AUTO: 0.1 THOU/UL (ref 0–0.2)
BASOPHILS NFR BLD AUTO: 0.5 % (ref 0–1)
BILIRUB SERPL-MCNC: 1.7 MG/DL (ref 0.2–1.2)
BUN SERPL-MCNC: 14 MG/DL (ref 7–18.7)
CALCIUM SERPL-MCNC: 10.9 MG/DL (ref 7.8–10.44)
CHLORIDE SERPL-SCNC: 99 MMOL/L (ref 98–107)
CO2 SERPL-SCNC: 28 MMOL/L (ref 22–29)
CREAT CL PREDICTED SERPL C-G-VRATE: 130 ML/MIN (ref 70–130)
EOSINOPHIL # BLD AUTO: 0.1 THOU/UL (ref 0–0.7)
EOSINOPHIL NFR BLD AUTO: 0.8 % (ref 0–10)
GLOBULIN SER CALC-MCNC: 3.4 G/DL (ref 2.4–3.5)
GLUCOSE SERPL-MCNC: 72 MG/DL (ref 70–105)
HGB BLD-MCNC: 12.3 G/DL (ref 12–16)
INR PPP: 1.1
LYMPHOCYTES # BLD: 2 THOU/UL (ref 1.2–3.4)
LYMPHOCYTES NFR BLD AUTO: 20.2 % (ref 21–51)
MCH RBC QN AUTO: 30.8 PG (ref 27–31)
MCV RBC AUTO: 94.7 FL (ref 81–99)
MONOCYTES # BLD AUTO: 0.7 THOU/UL (ref 0.11–0.59)
MONOCYTES NFR BLD AUTO: 7.2 % (ref 0–10)
NEUTROPHILS # BLD AUTO: 7.2 THOU/UL (ref 1.4–6.5)
NEUTROPHILS NFR BLD AUTO: 71.3 % (ref 42–75)
PLATELET # BLD AUTO: 231 THOU/UL (ref 130–400)
POTASSIUM SERPL-SCNC: 4.1 MMOL/L (ref 3.5–5.1)
PROTHROMBIN TIME: 14 SEC (ref 12–14.7)
RBC # BLD AUTO: 4 MILL/UL (ref 4.2–5.4)
SODIUM SERPL-SCNC: 135 MMOL/L (ref 136–145)
WBC # BLD AUTO: 10 THOU/UL (ref 4.8–10.8)

## 2018-02-14 PROCEDURE — 0HBU3ZX EXCISION OF LEFT BREAST, PERCUTANEOUS APPROACH, DIAGNOSTIC: ICD-10-PCS | Performed by: SPECIALIST

## 2018-02-14 RX ADMIN — DOCUSATE CALCIUM SCH MG: 240 CAPSULE, LIQUID FILLED ORAL at 10:10

## 2018-02-14 RX ADMIN — NIFEDIPINE SCH MG: 30 TABLET, FILM COATED, EXTENDED RELEASE ORAL at 10:10

## 2018-02-14 RX ADMIN — DOCUSATE CALCIUM SCH MG: 240 CAPSULE, LIQUID FILLED ORAL at 20:18

## 2018-02-14 RX ADMIN — Medication SCH ML: at 10:16

## 2018-02-14 RX ADMIN — Medication SCH ML: at 20:18

## 2018-02-14 NOTE — MRI
MRI BRAIN WITH GADOLINIUM CONTRAST:

 

History: Breast cancer. Abnormal MRI. 

 

Comparison: Noncontrast study 2-11-18. 

 

FINDINGS: 

No abnormal areas of contrast enhancement are apparent. The patchy areas of edema involving each cere
bral hemisphere on recent MRI exam are not well visualized on the T1 weighted images. There is no mas
s effect or shift of midline structures. The ventricles appear normal in size, shape, and position. 

 

IMPRESSION: 

No evidence of intracranial metastatic disease. 

 

POS: [facility]

## 2018-02-14 NOTE — NM
WHOLE BODY BONE SCAN:

2/14/18

 

HISTORY: 

Osseous metastatic disease.

 

RADIOPHARMACEUTICAL: 

33 millicuries technetium 99m-MDP injected intravenously.

 

FINDINGS:  

Correlation is made with the CT chest, abdomen and pelvis from previous day. 

 

There are multiple foci of increased uptake in the ribs, right sacrum, and inferior pubic rami consis
tent with osseous metastatic disease. The lytic lesions in the spine noted on the CT scan do not demo
nstrate increased uptake on the bone scan. 

 

Increased uptake in the shoulders, knees, ankles and feet demonstrate degenerative change. Trace excr
etion is seen through the kidneys with a full urinary bladder. 

 

IMPRESSION:  

Osseous metastatic disease. 

 

POS: [facility]IO

## 2018-02-14 NOTE — CON
DATE OF CONSULTATION:  02/14/2018

 

REASON FOR CONSULTATION:  Ms. Moran is a 32-year-old female who likely has been 
undiagnosed with a stage IV, T4N1M0 breast cancer.

 

HISTORY OF PRESENT ILLNESS:  Ms. Moran recently delivered her third child.  
This was approximately 7 days ago.  Apparently sometime during her pregnancy, 
she thought she felt a mass in the breast. Per patient, this was negative.  She 
then around the first of the year began having problems with pain in the lower 
back radiating down the right leg.  This was presumed to be from a sciatica 
from her pregnancy.  She saw Dr. Huang for this.  For about the past month, she 
has needed help with a walker to get around.  She delivered uneventfully.  
However, her lower back and right leg pain did not improve.  She then had a 
seizure after she had been home for a couple of days which caused her to be 
readmitted to the hospital.  She had a CT scan of the head which shows some low 
density areas which was concerning for a posterior reversible encephalopathy 
syndrome.  MRI of the brain was recommended.  While she is here in the hospital
, she has had no further seizures.  Her seizures have been controlled with 
magnesium sulfate.  She then had a workup of her back pain also.  MRI of the 
brain again showed numerous small patchy areas with foci of edema concerning 
for posterior reversible encephalopathy syndrome.  However, MRI of the lumbar 
spine and pelvis showed multiple areas that looked consistent with metastatic 
disease.  In the L3 vertebral body, there was some encroachment into the canal 
and epidural narrowing.  There was a lesion in the upper sacrum that was 
pressing on the right S1 nerve root.  There were multiple lesions in the pubic 
rami concern for pathological fracture in the pubic ramus.  Repeat MRI of brain 
with contrast did not demonstrate any metastatic lesions. She underwent a CT 
scan of the chest, abdomen, and pelvis.  This showed diffuse lesions in the 
liver consistent with metastatic disease.  There was ascites.  There was 
retroperitoneal adenopathy.  There were multiple lytic lesions in the bone.  In 
the left breast, there was skin thickening and a hyperdense mass.  There were 
enlarged left axillary lymph nodes.  Concern was for breast cancer.  She was 
seen by Kalli Chou/Dr. Murphy.  She also was seen by Dr. Pennington and earlier 
today underwent a breast biopsy.  Pathology results are pending.  She did have 
a bone scan which confirmed multiple lesions consistent with metastasis.  I 
have been asked to see her to discuss her options with radiation.

 

Presently, she reports the only area of pain is in the lower back radiating 
down into the right leg.  She is able to ambulate with the use of a walker.  
She is not having difficulty with urination or with her bowel movements.  She 
has no recent weight loss.  She voices no other complaints.  It should be noted 
that she had an MRI of the brain with contrast that showed no brain metastasis.

 

PAST MEDICAL HISTORY:

1.  D&C x1.

2.  She denies other medical or surgical problems.

 

MEDICATIONS:  Hydrocodone, prenatal vitamin, Lovenox, magnesium hydroxide, and 
Procardia.

 

ALLERGIES:  No known medical allergies.

 

SOCIAL HISTORY:  She lives at home with her .  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 
mom.  She does have 3 children.  She has no cigarette use and rarely drinks 
alcohol.

 

FAMILY HISTORY:  Her maternal grandmother had benign breast biopsy.  Her 
maternal grandfather had colon cancer.  There is no other family history of 
breast or ovarian cancer.

 

REVIEW OF SYSTEMS:  Twelve system review of systems is otherwise negative.

 

PHYSICAL EXAMINATION:

VITAL SIGNS:  Height 5 feet 4 inches, weight 163 pounds, blood pressure 115/66, 
pulse is 100, respirations are 20, temperature 98.5.

GENERAL:  She is alert and oriented and in no apparent distress.  She is well-
developed and well-nourished.  Karnofsky performance status is an 80%.

HEENT:  Eyes:  Pupils are equal, round, reactive.  Extraocular movements are 
intact.  ENT:  Oral cavity and oropharynx normal without lesion or erythema.  
Palate elevates symmetrically.  Gingiva is intact.

NECK:  Supple, without cervical or supraclavicular adenopathy.  No thyromegaly.
  Larynx is midline.

LUNGS:  Breathing nonlabored.  Clear to auscultation and percussion.

HEART:  Regular rate and rhythm without murmur.  No lower extremity edema.

BACK:  No tenderness on fist percussion of her spine.

ABDOMEN:  No axillary or inguinal adenopathy.

BREASTS:  Left breast reveals a large palpable mass in the upper outer 
quadrant.  There is edema of the left breast.

ABDOMEN:  Soft, nontender, nondistended.  Liver is large and palpable.  She 
does appear to have ascites.  She also has postpartum changes.

SKIN:  Without rash or purpura.

NEUROLOGIC:  Cranial nerves II-XII grossly intact.  Motor strength is 5/5 in 
both upper and lower extremities in all muscle groups tested.  Reflexes are 
normal and symmetrical in all extremities except for at the right knee where 
they are diminished.  Gait was not tested.

 

LABORATORY AND X-RAY FINDINGS:  CBC:  White blood cell count of 10,000 with 
hemoglobin of 12.3, hematocrit of 37.8.  Platelet count 231,000.  Chemistry 
group revealed normal electrolytes.  Creatinine was normal at 0.71.  Calcium 
was elevated at 10.9 and bilirubin was elevated at 1.7.  Total protein was 
decreased at 5.9 and albumin was decreased at 2.5.  Alkaline phosphatase is 
elevated at 732, AST elevated at 427, ALT elevated at 117.  Biopsy from the 
breast is pending.

 

RADIOLOGIC:  MRI of the brain, MRI of the lumbar spine, MRI of the pelvis, CT 
scan of the chest, abdomen, and pelvis, and bone scan were all personally 
reviewed.  Again, she has skin thickening on the left breast with a visible 
mass in the breast on CT scan.  She has left axillary adenopathy.  She has 
multiple lytic lesions in the bone including the pubic ramus, lumbar and 
thoracic spine.  In the L3 vertebral body, there is narrowing of the epidural 
space.  She has a lesion on the right side of the sacrum, which is pressing on 
the S1 nerve root.  She has multiple lesions in the pubic ramus.  Bone scan 
confirms the metastatic lesions.

 

ASSESSMENT:  Ms. Moran is a 32-year-old female with what appears to be 
metastatic breast cancer.  This appears to be a stage, T4N1M1 lesion.

 

PLAN:  I had a long discussion with Ms. Moran and her  regarding her 
diagnosis, prognosis, prognostic factors, and treatment options.  We need to 
get the biopsy results.  Specifically, we need to not only confirm that this is 
metastatic breast cancer, but also get the receptor analysis including estrogen 
and progesterone receptor and HER2 receptor.  She has a large systemic burden.  
She is also symptomatic from her bone metastasis in the lower lumbar spine and 
sacral region.  I believe this is causing sciatica from the S1 lesion.  She has 
no evidence of spinal cord compression.  I have briefly discussed the case with 
Dr. Murphy.  I will discuss with her the elevated calcium.  Options for 
treatment may depend on the results of the biopsy.  With her large systemic 
burden, especially since her liver has such extensive metastatic disease, she 
really needs to begin systemic chemotherapy fairly soon.  The role of radiation 
would be primarily to palliate her lower back pain and try and improve her 
sciatica.  It would be very difficult to deliver radiation therapy at the same 
time as chemotherapy.  This will be coordinated with Dr. Murphy.  I did 
discuss with Ms. Moran and her  a possible course of radiation therapy.  
The logistics of radiation as well as the benefits and risks of treatment were 
discussed.  Side effects would include but not be limited to skin reaction, 
fatigue, lower blood counts, nausea, vomiting, diarrhea, and small risk of 
damage to her intestines or other structures which receive radiation therapy.  
Time was taken to answer questions regarding breast cancer and regarding 
possible treatment options at this point.  We will await the results of the 
biopsy and then make a final decision regarding how best to proceed with 
treatment in conjunction with Dr. Murphy.

 

Thank you for this interesting consultation.

 

LAURA

## 2018-02-14 NOTE — PRG
DATE OF SERVICE:  2018

 

SUBJECTIVE:  The patient continues to have some pain in the right pelvis and hip, which is limiting h
er mobility.  It is somewhat better controlled with ibuprofen.  We tried morphine overnight and that 
really did not help her very much.  She denies any headache or blurry vision.  She denies any right u
pper quadrant abdominal pain.

 

OBJECTIVE:

VITAL SIGNS:  Blood pressures are one teens to 130s over 80s to 90s.

GENERAL:  This is a well-developed, well-nourished female in no apparent distress.

HEENT:  Unremarkable.

HEART:  Regular rate and rhythm with no murmurs.

LUNGS:  Clear to auscultation bilaterally.

ABDOMEN:  Soft, nontender, nondistended with normoactive bowel sounds.  Uterine fundus is firm and be
low the umbilicus.

EXTREMITIES:  Show no clubbing, cyanosis, or edema.  She does have tenderness in the lumbar spine and
 right hip region not so much on palpation, but definitely with movement of the leg and certain postu
res.

 

LABORATORY DATA:  Labs are remarkable for mild hyponatremia at 135, hypercalcemia and elevated liver 
function tests.  INR is 1.1.  Platelets are 220.  MRI of the lumbar spine and pelvis showed diffuse m
etastatic disease.  CT of the chest, abdomen, and pelvis showed a mass in the left breast with axilla
ry lymphadenopathy as well as tumor burden in the liver and retroperitoneal lymph nodes.  She also ha
d some ascites fluid in the abdomen.  MRI of the brain with contrast did not show metastatic disease 
in the brain.

 

ASSESSMENT:  This is a 32-year-old female who is 7 days postpartum  with what appears to be meta
static breast cancer.

1.  Dr. Pennington performed a bedside biopsy today confirming malignancy.  This was discussed at length
 with the patient and her  this afternoon.  Additional testing on the biopsy specimen will be 
back for a couple of days.

2.  Diffuse metastatic disease.  Dr. Lane has been consulted to see if radiation might alleviate theresa
e of her pain.  Dr. Murphy is on consult and will formulate a chemotherapy plan for the patient.  Sh
e is undergoing a bone scan this afternoon to further assess end-stage the tumor.

3.  Postpartum state.  The patient is doing well from a post-partum standpoint, she did come in with 
what appeared to be an eclamptic seizure, but with tumor burden, it is unclear whether the seizure wa
s truly eclampsia or related to the cancer.  The MRI did not show intracranial metastases.  She has h
ad no further signs of eclampsia and she did receive magnesium for 24 hours.

4.  Elevated blood pressure.  Her blood pressures are kind of up and down.  She has been getting Proc
ardia and I did give a single dose of hydralazine last night for one elevated blood pressure.  She re
sponded well to the hydralazine and has not required additional blood pressure medicines today.

5.  Discharge planning.  Patient plans to go home, the only equipment that she might need a grab bar 
in the bathroom and a shower chair.  She states that she has a walker and feels like she can get from
 the bed to the bathroom easily.  We do not have stairs in their home.

6.  Prognosis is dependent on additional findings on the biopsy.  This was discussed with the patient
 and her  as well as discussed with Dr. Murphy and Dr. Pennington.  We will await the additional
 testing on the biopsy specimen.

## 2018-02-14 NOTE — PDOC.EVN
Event Note





- Event Note


Event Note: 


@0840: I assumed care this morning after off shift. I was updated on the patient

's status. New DX of metastatic Breast CA...likely Inflammatory Breast Cancer. 

Multiple bone mets. Sz activity may have been due to brain mets First Brain MRI 

that I ordered was not contrasted, may have not picked up brain mets. Follow up 

per primary care team. I will see her later this am.

## 2018-02-15 VITALS — TEMPERATURE: 97.7 F

## 2018-02-15 VITALS — SYSTOLIC BLOOD PRESSURE: 133 MMHG | DIASTOLIC BLOOD PRESSURE: 93 MMHG

## 2018-02-15 RX ADMIN — Medication PRN ML: at 10:58

## 2018-02-15 RX ADMIN — NIFEDIPINE SCH MG: 30 TABLET, FILM COATED, EXTENDED RELEASE ORAL at 09:10

## 2018-02-15 RX ADMIN — Medication SCH ML: at 09:11

## 2018-02-15 RX ADMIN — Medication SCH ML: at 00:23

## 2018-02-15 RX ADMIN — DOCUSATE CALCIUM SCH MG: 240 CAPSULE, LIQUID FILLED ORAL at 09:10

## 2018-02-15 RX ADMIN — MORPHINE SULFATE PRN MG: 5 INJECTION, SOLUTION INTRAMUSCULAR; INTRAVENOUS at 00:21

## 2018-02-15 NOTE — OP
PREOPERATIVE DIAGNOSES:  Left breast mass, suspected left breast cancer with probable inflammatory co
mponent.

 

POSTOPERATIVE DIAGNOSES:  Left breast mass, suspected left breast cancer with probable inflammatory c
omponent.

 

PROCEDURE PERFORMED:  Ultrasound-guided left breast core needle biopsy.

 

SURGEON:  Sam Pennington M.D.

 

ANESTHESIA:  1% lidocaine with epinephrine.

 

INDICATIONS:  The patient with a dominant palpable abnormality in the left breast and likely extensiv
e metastatic disease to liver and bones.

 

PROCEDURE IN DETAIL:  Informed consent was obtained.  She was placed in the supine position in her be
d on the postpartum floor.  The left breast was examined with ultrasound.  In the area of the dominan
t abnormalities, there is a lobulated hypoechoic lesion that appears to emanate from the deeper hyper
echoic tissue that appears to have white speckles, potentially consistent with calcifications.  The t
ransverse diameter appears to be about 3.5 cm.  There is obvious skin thickening while examining the 
lower breast, but cannot discern definite masses within the lower breast.

 

I decided upon a lateral approach.  Using ultrasound guidance, cleansed the skin with alcohol and loc
ally anesthetized with 1% lidocaine with epinephrine.  A small stab incision was created through whic
h a 14-gauge bard core biopsy needle was advanced into the breast.  Under ultrasound guidance, I obta
ined 5 separate core biopsies, both with superficial hypoechoic area in the deeper hypoechoic area.  
These were submitted both for frozen section and permanent section in both dry and formalin containin
g specimen cups.

 

The patient had no discomfort with biopsy.  There was minimal blood loss.  Band-Aid dressing was appl
ied.  There were no complications.  Patient tolerated well.

 

The specimen that I submitted for frozen section revealed obvious malignant disease.  Based upon this
 finding, I will wait for the final pathology to include the breast profile to help determine treatme
nt options.

## 2018-02-15 NOTE — CON
DATE OF CONSULTATION:  02/14/2018

 

CONSULTING PHYSICIAN:  Blank Mckenna M.D.

 

REASON FOR CONSULTATION:  Suspected metastatic left breast cancer.

 

HISTORY OF PRESENT ILLNESS:  Patient is a very pleasant, but unfortunate 32-year-old white female.  S
he is postpartum day #5, having given birth to her third child within the last week.  She apparently 
had some complaints of left breast abnormality and abdominal and back discomfort during her pregnancy
.  Left breast ultrasound obtained in November was read as nonremarkable.  Most of her other symptoms
 potentially seemed to be consistent with pregnancy related symptoms.

 

She returned to the emergency room on 02/13/2018 following a seizure at home.  CT scan of the brain w
as obtained and was felt to potentially reveal evidence of metastatic lesions to the brain.  For this
 reason, a CT scan of chest, abdomen, and pelvis was obtained.  This revealed dominant abnormalities 
of the left breast, liver, and pelvis.

 

The left breast had extensive skin thickening with a dominant mass in the upper outer left breast.  T
here was also noted to be left axillary lymphadenopathy.  The liver was full of innumerable apparentl
y metastatic lesions.  The pelvis was noted to have metastatic lesions involving both the inferior an
d superior pubic rami.

 

I am consulted at this time to hopefully be able to obtain breast biopsies for diagnosis in order to 
consider treatment options.

 

Patient notes an easily palpable mass in the upper outer left breast, between the 1 and 2 o'clock rad
eric.

 

PAST MEDICAL HISTORY:  Essentially unremarkable.

 

PAST SURGICAL HISTORY:  D and C x2 and wisdom tooth extraction.

 

ALLERGIES:  No known drug allergies.

 

CURRENT MEDICATIONS:  Iron, aspirin, hydrocodone.

 

PRIMARY CARE PHYSICIAN:  Blank Mckenna M.D.

 

PERSONAL/SOCIAL HISTORY:  She is  and  is present at bedside.  She is staying at home w
ith mother.  She does not smoke and drinks alcohol occasionally when she is not pregnant.

 

FAMILY HISTORY:  There is no known history of breast cancer in the family.

 

REVIEW OF SYSTEMS:  Ten system review is negative other than as mentioned above.

 

PHYSICAL EXAMINATION:

VITAL SIGNS:  She is afebrile.  Vital signs within normal limits.

GENERAL:  She is a well-developed, well-nourished, pleasant, and alert white female resting in bed wi
th her  at bedside.  She is alert and oriented x3 and cooperative.

HEAD, EYES, EARS, NOSE, AND THROAT:  Unremarkable.

NECK:  Supple, without mass or tenderness.

LUNGS:  Clear to auscultation throughout.

CARDIAC:  Regular rate and rhythm without murmur.

ABDOMEN:  Soft, nontender, nondistended.  I am unable to definitely palpate the liver.  Pelvis is not
 significantly evaluated.

BREASTS:  Her right breast is engorged as typical for a recent postpartum female.  There is no domina
nt visible or palpable lesion within the right breast or axilla.  The left breast is significantly ed
ematous in comparison to the right.  There is obvious Peau D'Orange change.  There is a dominant palp
able mass in the upper outer left breast that appears to be about 3-4 cm in size.  I am unable to pal
oseguera the lymphadenopathy that is visible on CT scan.

 

ASSESSMENT:  Patient with an unfortunate extensive metastatic disease involving the bone and liver.  
She has significant left breast abnormality that based upon the physical examination with Peau D'Bacilio
ge change in the CT appearance, this appears to be consistent with inflammatory breast cancer.

 

PLAN:  I recommend left breast ultrasound-guided breast biopsy.  I discussed this in detail with the 
patient and her .  I will perform this at bedside on the postpartum floor.

## 2018-02-15 NOTE — PRG
DATE OF SERVICE:  02/15/2018 

 

Ms. Moran is doing well following her left breast biopsy performed yesterday.  The pathology from the
 biopsy did reveal malignancy in the left upper breast.  Final pathology and breast profile are of reza gillespie still pending.

 

The patient has no complaints regarding her biopsy site.  The Band-Aid is still intact.  She had some
 minor discomfort.  

 

PHYSICAL EXAMINATION: 

VITAL SIGNS:  On examination, she is afebrile.  Vital signs are normal.  

BREASTS:  Left breast is without evidence of bruising or tenderness and the Band-Aid is intact.  

 

ASSESSMENT:  She is doing well following left breast biopsy.  Final pathology is pending.  She will f
ollow up with Dr. Murphy and Dr. Lane regarding her oncologic issues.  I will be available to put a
 MediPort in should she decide to proceed with this for chemotherapy administration.  She is certainl
y stable for discharge from a surgical standpoint.

## 2018-02-15 NOTE — ULT
BILATERAL LOWER EXTREMITY VENOUS DOPPLER ULTRASOUND:

 

History 

Right leg pain.

 

TECHNIQUE: 

Gray scale, color flow, and spectral Doppler imaging of the deep venous systems of the lower extremit
ies was performed bilaterally.

 

FINDINGS: 

There is good flow, compression, and augmentation noted in the common femoral, femoral, deep femoral,
 popliteal, posterior tibial, and greater saphenous veins on either side.

 

IMPRESSION: 

No evidence of deep vein thrombosis in either lower extremity.

 

POS: [facility]

## 2018-02-15 NOTE — PDOC.EVN
Event Note





- Event Note


Event Note: 


OB Follow up chart check: Saw MRI repeat...no intracranial evidence of 

metastatic disease.

## 2018-02-16 NOTE — DIS
DATE OF ADMISSION:  02/11/2018

 

DATE OF DISCHARGE:  02/15/2018

 

DISCHARGE DIAGNOSES:

1.  Metastatic breast cancer with bone and liver involvement.

2.  Postpartum eclampsia, resolved.

3.  High blood pressure.

4.  Sciatic nerve pain from metastatic disease.

 

DISCHARGE MEDICATIONS:

1.  Lovenox 30 mg subcutaneously once a day.

2.  Vicoprofen 7.5/200 one p.o. q.6 hours p.r.n. for pain.

3.  Ibuprofen 800 mg p.o. q.8 hours p.r.n.

4.  Toprol-XL 50 mg p.o. daily.  She can also continue her prenatal vitamin as well as her aspirin.

 

DISCHARGE DIET:  Regular diet.

 

DISCHARGE PRECAUTIONS:  Fall precautions.  Use a walker in the household to get around.  Her  
will be installing grab bars in the bathroom.  We offered a wheelchair and she will let me know if th
ey desire that at a future time.

 

DISCHARGE REFERRALS:  She is being referred to physical therapy.  We will set that up as an outpatien
t.

 

DISCHARGE FOLLOWUP:  The patient will follow up with Dr. Murphy on Monday, 02/19.  She will follow u
p with Dr. Lane on 02/16 for radiation therapy and she will follow up with me in 2-3 weeks for her b
lood pressure.  They have also chosen to seek consultation at [facility] and they will make those ar
rangements for follow up as well.  We provided records for them.

 

BRIEF HOSPITAL COURSE:  This is a 32-year-old G5, P3 who presented 6 days postpartum with what appear
ed to an eclamptic seizure.  At that time, she did have severe range of blood pressures and was treat
ed as a patient with eclampsia.  She had a CT scan of her brain done in the ER that did not show any 
masses and showed likely posterior reversible encephalopathy, which would be consistent with eclampsi
a.  She did undergo brain MRI at that time without contrast and it confirmed that finding.  She was a
dmitted to Labor and Delivery and treated with magnesium for 24 hours, which resolved the eclampsia. 
 Notable other findings at that time were elevation of her liver function tests.  Renal function was 
normal and platelets were normal as well.  Notably, she did not have any issues with blood pressure d
uring her pregnancy.  She had some isolated high blood pressure when she first arrived to labor and d
elivery for labor; however, these resolved after epidural anesthesia and she was in a significant chon
unt of pain at the time.

 

Once the eclampsia was under control, the patient was noted to be in significant pain from her right 
hip and low back and right sciatic area.  This is something that started during late pregnancy.  She 
had seen Orthopedics and so Dr. Huang was again consulted.  He ordered x-rays, which did not really s
how anything too significant except for a questionable lytic lesion in the left pelvis.  For that marybeth
son, an MRI of the spine and pelvis was ordered.  This showed multiple areas of what appeared to be m
etastatic disease in the inferior pubic rami to sacrum and the lumbar spine.  Following these finding
s, I discussed the findings with the patient and her .  We consulted Oncology and began a [facility]
er workup.

 

She underwent CT of the chest, abdomen and pelvis, which showed a mass in the left breast as well as 
metastatic disease in the liver, the retroperitoneal lymph nodes and the bone.  Surgery was consulted
 for biopsy of the left breast, which they performed at bedside without complication.  It did confirm
 a ductal breast cancer, which is the source of her metastases as well.  Final pathology on that is p
ending at the time of this dictation.

 

She underwent bone scan and consultation with the radiation oncologist to see if he could palliate so
me of her pain.  It does appear that there is a metastatic focus putting pressure on her right S1 ner
ve root, which is likely a significant source of her pain at this time and limiting her mobility.  Dr Neeru Lane does feel like XRT will be helpful for at least palliating that.

 

After all the appropriate studies were done, the patient and her  asked to go home and she was
 medically stable to do so.  So on 02/15/2018, she was discharged home in the care of her  to 
follow up within just a couple of days with Oncology.

## 2018-02-16 NOTE — PRG
DATE OF SERVICE:  02/15/2018

 

TIME 12:30 p.m.

 

SUBJECTIVE:  The patient reports that her pain is under control with Vicoprofen and ibuprofen alterna
ting.  She does appear more comfortable in the bed.  She denies any headache, visual changes or right
 upper quadrant pain.  She denies any swelling in the legs.  She does still complain of some mild amador
f soreness that started initially after her seizure several days ago.

 

OBJECTIVE : 

VITAL SIGNS:  Blood pressure is 130s/90s, respirations are 18, O2 sat is 97% on room air, pulse is ar
ound 100, and she is afebrile.

GENERAL:  She is well-developed, well-nourished, in no apparent distress.

HEENT:  Unremarkable.

HEART:  Regular rate and rhythm with no murmurs.

LUNGS:  Clear to auscultation bilaterally.

ABDOMEN:  Soft, nontender, slightly distended with hypoactive bowel sounds.

EXTREMITIES:  Show no clubbing, cyanosis, or edema.  There is some mild tenderness with palpation of 
the calves.  Reflexes are normal.  

 

New studies include a bone scan which showed uptake in the ribs, right sacrum and inferior pubic rami
.  Notably the lytic lesions in the spine do not demonstrate increased uptake on the bone scan.  Dopp
ler of the bilateral lower extremities is negative for clot.  Echocardiogram is pending at the time o
f this dictation.  

 

LABS:  There are no new labs today.  Calcium yesterday was 10.9.

 

ASSESSMENT:  This is a 32-year-old female G5, P3-0-2-3 with metastatic breast cancer.

1.  From a cancer standpoint, Dr. Murphy and Dr. Lane have both been consulted.  Dr. Lane does fee
l like palliative radiation therapy would be helpful potentially to alleviate some pressure on her ri
ght sacral nerve root that seems to be causing the bulk of her pain.  Dr. Murphy has arranged for fo
llowup with the patient on Monday to discuss treatment options and start on a treatment plan.  Biopsy
 results should be final by Monday afternoon.

2.  Eclamptic seizure.  The patient had no other signs of eclampsia and her blood pressure has been v
angie well controlled for the last couple of days.  We will send her home with Toprol-XL 50 mg and foll
ow up with me in 2-3 weeks for the blood pressure.

3.  Echocardiogram has been done and is pending.  This is just in anticipation of potential chemother
apy agents.  

4.  Limited mobility secondary to pain.  For this reason, we will send her home with Lovenox as well 
as set up home PT to come out to the house and work with her to avoid any further deconditioning.  At
 this point in time she has been essentially bedridden for the last 3 weeks.

5.  Postpartum.  Her bleeding is subsiding.  Her postpartum course, is essentially normal.  She is pu
mping the right breast.  A consultation with the lactation consultant advised that she stop stimulati
ng the left breast and wean from that side since the cancer is present there and the breasts is not p
roducing normally.

 

DISPOSITION:  The patient will be discharged home today 02/15/2018 to follow up with Dr. Murphy on M
onday, to follow up with Dr. Lane on Friday and to follow up with me in 2-3 weeks.

## 2018-02-23 ENCOUNTER — HOSPITAL ENCOUNTER (OUTPATIENT)
Dept: HOSPITAL 92 - SDC | Age: 33
Discharge: HOME | End: 2018-02-23
Attending: SPECIALIST
Payer: COMMERCIAL

## 2018-02-23 VITALS — BODY MASS INDEX: 22.4 KG/M2

## 2018-02-23 DIAGNOSIS — Z79.01: ICD-10-CM

## 2018-02-23 DIAGNOSIS — Z98.890: ICD-10-CM

## 2018-02-23 DIAGNOSIS — Z79.899: ICD-10-CM

## 2018-02-23 DIAGNOSIS — C50.912: ICD-10-CM

## 2018-02-23 PROCEDURE — C1788 PORT, INDWELLING, IMP: HCPCS

## 2018-02-23 PROCEDURE — 76000 FLUOROSCOPY <1 HR PHYS/QHP: CPT

## 2018-02-23 PROCEDURE — 71045 X-RAY EXAM CHEST 1 VIEW: CPT

## 2018-02-23 PROCEDURE — 05H533Z INSERTION OF INFUSION DEVICE INTO RIGHT SUBCLAVIAN VEIN, PERCUTANEOUS APPROACH: ICD-10-PCS | Performed by: SPECIALIST

## 2018-02-23 PROCEDURE — B516ZZA FLUOROSCOPY OF RIGHT SUBCLAVIAN VEIN, GUIDANCE: ICD-10-PCS | Performed by: SPECIALIST

## 2018-02-23 PROCEDURE — S0020 INJECTION, BUPIVICAINE HYDRO: HCPCS

## 2018-02-23 NOTE — RAD
PORTABLE CHEST 1 VIEW:

 

Date:  02/23/18 

Time:  1400 hours

 

HISTORY:  

Postop MediPort placement. 

 

FINDINGS/IMPRESSION: 

There is a right subclavian Port-A-Cath with tip in the projection of the SVC close to the cavoatrial
 junction. No pneumothoraces are seen. There is left lower lobe consolidation with accompanying effus
ion. 

 

 

POS: [facility]

## 2018-02-26 NOTE — OP
DATE OF OPERATION:  02/23/2018

 

PREOPERATIVE DIAGNOSIS:  Metastatic breast cancer.

 

POSTOPERATIVE DIAGNOSIS:  Metastatic breast cancer.

 

OPERATION PERFORMED:  Placement of right subclavian MediPort.

 

SURGEON:  Sam Pennington M.D.

 

ANESTHESIA:  Total intravenous anesthesia.

 

INDICATIONS:  The patient is a 32-year-old white female, who was recently diagnosed with widely metas
tatic left breast cancer.  She presents at this time for placement of MediPort for chemotherapy.

 

DESCRIPTION OF OPERATION:  Informed consent was obtained.  The patient was taken to the operating torrie
m where total intravenous anesthesia was obtained with the patient in supine position.  Right pericla
vicular area was prepped with ChloraPrep and draped in sterile fashion.  Local anesthetic was infiltr
ated and a large gauge needle was passed under the clavicle in the subclavian vein.  Guidewire was pa
ssed through the needle and fluoroscopically confirmed to enter the superior vena cava.  Additional l
ocal anesthetic was infiltrated and transverse incision was created based on needle insertion site.  
A subcutaneous pocket was dissected inferiorly.  Introducer dilator was passed over the guidewire und
er fluoroscopic guidance.  The guidewire and dilator were removed, and the catheter was passed throug
h the introducer.  The tip of the catheter was positioned at the atriocaval junction and the catheter
 was trimmed to the appropriate length and secured to the locking hub of the MediPort.  The port was 
then placed in the subcutaneous pocket where it was secured to the pectoral fascia with 2 interrupted
 sutures of 3-0 Prolene.  The incision was then closed in layers with 3-0 and 4-0 Monocryl.  Addition
al local anesthetic was infiltrated.  The port was cannulated with a Baca needle and it aspirated bl
ood freely and was flushed with heparinized saline.  Dermabond was placed externally on the skin inci
kathy.  There were no complications.  Blood loss was negligible.  The patient tolerated the procedure 
well and was taken to recovery room in stable condition.

 

FINDINGS:  I used a low-profile power compatible MediPort.  Her anatomy was within normal limits and 
there were no problems during the procedure.

## 2018-02-27 NOTE — OP
DATE OF PROCEDURE:  02/23/2018

 

PREOPERATIVE DIAGNOSIS:  Metastatic left breast cancer.

 

POSTOPERATIVE DIAGNOSIS:  Metastatic left breast cancer.

 

OPERATION PERFORMED:  Placement of a right subclavian low profile power compatible MediPort.

 

SURGEON:  Dr. Sam Pennington.

 

ANESTHESIA:  Total intravenous anesthesia per Kalli Lambert CRNA with infiltration of local anestheti
c.

 

INDICATIONS:  Patient is a 32-year-old white female who presents with widely metastatic left breast c
ancer.  MediPort placement is requested for chemotherapy administration.

 

PROCEDURE IN DETAIL:  

 

FINDINGS:  The surgery was uneventful.  Her anatomy was usual.  There was essentially no blood loss, 
no complications.  The low profile port was selected.

## 2018-03-05 ENCOUNTER — HOSPITAL ENCOUNTER (EMERGENCY)
Dept: HOSPITAL 92 - ERS | Age: 33
Discharge: HOME | End: 2018-03-05
Payer: COMMERCIAL

## 2018-03-05 DIAGNOSIS — F32.9: ICD-10-CM

## 2018-03-05 DIAGNOSIS — C79.81: ICD-10-CM

## 2018-03-05 DIAGNOSIS — R18.8: Primary | ICD-10-CM

## 2018-03-05 LAB
ALBUMIN SERPL BCG-MCNC: 2.1 G/DL (ref 3.5–5)
ALP SERPL-CCNC: 1106 U/L (ref 40–150)
ALT SERPL W P-5'-P-CCNC: 520 U/L (ref 8–55)
ANION GAP SERPL CALC-SCNC: 11 MMOL/L (ref 10–20)
ANISOCYTOSIS BLD QL SMEAR: (no result) (100X)
AST SERPL-CCNC: 1927 U/L (ref 5–34)
BILIRUB SERPL-MCNC: 2.2 MG/DL (ref 0.2–1.2)
BUN SERPL-MCNC: 12 MG/DL (ref 7–18.7)
CALCIUM SERPL-MCNC: 7 MG/DL (ref 7.8–10.44)
CHLORIDE SERPL-SCNC: 102 MMOL/L (ref 98–107)
CO2 SERPL-SCNC: 24 MMOL/L (ref 22–29)
CREAT CL PREDICTED SERPL C-G-VRATE: 0 ML/MIN (ref 70–130)
GLOBULIN SER CALC-MCNC: 2.4 G/DL (ref 2.4–3.5)
GLUCOSE SERPL-MCNC: 102 MG/DL (ref 70–105)
HGB BLD-MCNC: 9.5 G/DL (ref 12–16)
MCH RBC QN AUTO: 29.5 PG (ref 27–31)
MCV RBC AUTO: 90.1 FL (ref 81–99)
MDIFF COMPLETE?: YES
PLATELET # BLD AUTO: 64 THOU/UL (ref 130–400)
PLATELET BLD QL SMEAR: (no result)
POTASSIUM SERPL-SCNC: 3.7 MMOL/L (ref 3.5–5.1)
RBC # BLD AUTO: 3.21 MILL/UL (ref 4.2–5.4)
SODIUM SERPL-SCNC: 133 MMOL/L (ref 136–145)
WBC # BLD AUTO: 4.2 THOU/UL (ref 4.8–10.8)

## 2018-03-05 PROCEDURE — 85730 THROMBOPLASTIN TIME PARTIAL: CPT

## 2018-03-05 PROCEDURE — 71045 X-RAY EXAM CHEST 1 VIEW: CPT

## 2018-03-05 PROCEDURE — 36415 COLL VENOUS BLD VENIPUNCTURE: CPT

## 2018-03-05 PROCEDURE — 85652 RBC SED RATE AUTOMATED: CPT

## 2018-03-05 PROCEDURE — 85025 COMPLETE CBC W/AUTO DIFF WBC: CPT

## 2018-03-05 PROCEDURE — 80053 COMPREHEN METABOLIC PANEL: CPT

## 2018-03-05 NOTE — RAD
CHEST 1 VIEW:

 

Date:  03/05/18 

 

HISTORY:  

Dyspnea. 

 

COMPARISON:  

02/23/18. 

 

HISTORY:  

Stage IV breast cancer. 

 

FINDINGS:

There is a left-sided MediPort catheter with the distal tip projecting over the superior vena cava. N
o pneumothorax. 

 

There are pleural and parenchymal changes of left hemithorax, similar to the previous examination. St
able aeration of the right lung. No pneumothorax or osseous abnormalities. 

 

IMPRESSION: 

Persistent pleural and parenchymal changes left lung base. 

 

 

POS: [facility]

## 2018-03-11 ENCOUNTER — HOSPITAL ENCOUNTER (INPATIENT)
Dept: HOSPITAL 92 - ERS | Age: 33
LOS: 4 days | Discharge: HOSPICE | DRG: 871 | End: 2018-03-15
Attending: INTERNAL MEDICINE | Admitting: INTERNAL MEDICINE
Payer: COMMERCIAL

## 2018-03-11 VITALS — BODY MASS INDEX: 29.6 KG/M2

## 2018-03-11 DIAGNOSIS — Z17.1: ICD-10-CM

## 2018-03-11 DIAGNOSIS — C79.51: ICD-10-CM

## 2018-03-11 DIAGNOSIS — C78.7: ICD-10-CM

## 2018-03-11 DIAGNOSIS — R64: ICD-10-CM

## 2018-03-11 DIAGNOSIS — T45.1X5A: ICD-10-CM

## 2018-03-11 DIAGNOSIS — E86.9: ICD-10-CM

## 2018-03-11 DIAGNOSIS — D64.9: ICD-10-CM

## 2018-03-11 DIAGNOSIS — A41.9: Primary | ICD-10-CM

## 2018-03-11 DIAGNOSIS — D65: ICD-10-CM

## 2018-03-11 DIAGNOSIS — J90: ICD-10-CM

## 2018-03-11 DIAGNOSIS — I81: ICD-10-CM

## 2018-03-11 DIAGNOSIS — K72.00: ICD-10-CM

## 2018-03-11 DIAGNOSIS — C50.412: ICD-10-CM

## 2018-03-11 DIAGNOSIS — E87.2: ICD-10-CM

## 2018-03-11 DIAGNOSIS — Z51.5: ICD-10-CM

## 2018-03-11 DIAGNOSIS — D69.6: ICD-10-CM

## 2018-03-11 DIAGNOSIS — R18.0: ICD-10-CM

## 2018-03-11 DIAGNOSIS — E87.1: ICD-10-CM

## 2018-03-11 PROCEDURE — 76705 ECHO EXAM OF ABDOMEN: CPT

## 2018-03-11 PROCEDURE — 80076 HEPATIC FUNCTION PANEL: CPT

## 2018-03-11 PROCEDURE — 85384 FIBRINOGEN ACTIVITY: CPT

## 2018-03-11 PROCEDURE — 83615 LACTATE (LD) (LDH) ENZYME: CPT

## 2018-03-11 PROCEDURE — 86901 BLOOD TYPING SEROLOGIC RH(D): CPT

## 2018-03-11 PROCEDURE — 85025 COMPLETE CBC W/AUTO DIFF WBC: CPT

## 2018-03-11 PROCEDURE — 36416 COLLJ CAPILLARY BLOOD SPEC: CPT

## 2018-03-11 PROCEDURE — P9016 RBC LEUKOCYTES REDUCED: HCPCS

## 2018-03-11 PROCEDURE — 36415 COLL VENOUS BLD VENIPUNCTURE: CPT

## 2018-03-11 PROCEDURE — 71045 X-RAY EXAM CHEST 1 VIEW: CPT

## 2018-03-11 PROCEDURE — 86850 RBC ANTIBODY SCREEN: CPT

## 2018-03-11 PROCEDURE — 87040 BLOOD CULTURE FOR BACTERIA: CPT

## 2018-03-11 PROCEDURE — 85046 RETICYTE/HGB CONCENTRATE: CPT

## 2018-03-11 PROCEDURE — 96374 THER/PROPH/DIAG INJ IV PUSH: CPT

## 2018-03-11 PROCEDURE — 36430 TRANSFUSION BLD/BLD COMPNT: CPT

## 2018-03-11 PROCEDURE — 85362 FIBRIN DEGRADATION PRODUCTS: CPT

## 2018-03-11 PROCEDURE — 85730 THROMBOPLASTIN TIME PARTIAL: CPT

## 2018-03-11 PROCEDURE — 83010 ASSAY OF HAPTOGLOBIN QUANT: CPT

## 2018-03-11 PROCEDURE — 96375 TX/PRO/DX INJ NEW DRUG ADDON: CPT

## 2018-03-11 PROCEDURE — 85610 PROTHROMBIN TIME: CPT

## 2018-03-11 PROCEDURE — 82550 ASSAY OF CK (CPK): CPT

## 2018-03-11 PROCEDURE — A4216 STERILE WATER/SALINE, 10 ML: HCPCS

## 2018-03-11 PROCEDURE — 96361 HYDRATE IV INFUSION ADD-ON: CPT

## 2018-03-11 PROCEDURE — 85060 BLOOD SMEAR INTERPRETATION: CPT

## 2018-03-11 PROCEDURE — 85300 ANTITHROMBIN III ACTIVITY: CPT

## 2018-03-11 PROCEDURE — 83605 ASSAY OF LACTIC ACID: CPT

## 2018-03-11 PROCEDURE — 85379 FIBRIN DEGRADATION QUANT: CPT

## 2018-03-11 PROCEDURE — 80048 BASIC METABOLIC PNL TOTAL CA: CPT

## 2018-03-11 PROCEDURE — 83690 ASSAY OF LIPASE: CPT

## 2018-03-11 PROCEDURE — 83880 ASSAY OF NATRIURETIC PEPTIDE: CPT

## 2018-03-11 PROCEDURE — 85049 AUTOMATED PLATELET COUNT: CPT

## 2018-03-11 PROCEDURE — 80053 COMPREHEN METABOLIC PANEL: CPT

## 2018-03-11 PROCEDURE — 86900 BLOOD TYPING SEROLOGIC ABO: CPT

## 2018-03-12 LAB
ALBUMIN SERPL BCG-MCNC: 1.7 G/DL (ref 3.5–5)
ALP SERPL-CCNC: 1096 U/L (ref 40–150)
ALT SERPL W P-5'-P-CCNC: 529 U/L (ref 8–55)
ANION GAP SERPL CALC-SCNC: 19 MMOL/L (ref 10–20)
ANION GAP SERPL CALC-SCNC: 27 MMOL/L (ref 10–20)
APTT PPP: 76.1 SEC (ref 22.9–36.1)
APTT PPP: 78.5 SEC (ref 22.9–36.1)
AST SERPL-CCNC: 1675 U/L (ref 5–34)
BILIRUB SERPL-MCNC: 5.1 MG/DL (ref 0.2–1.2)
BUN SERPL-MCNC: 37 MG/DL (ref 7–18.7)
BUN SERPL-MCNC: 39 MG/DL (ref 7–18.7)
CALCIUM SERPL-MCNC: 6.7 MG/DL (ref 7.8–10.44)
CALCIUM SERPL-MCNC: 7 MG/DL (ref 7.8–10.44)
CHLORIDE SERPL-SCNC: 96 MMOL/L (ref 98–107)
CHLORIDE SERPL-SCNC: 98 MMOL/L (ref 98–107)
CK SERPL-CCNC: 2092 U/L (ref 29–168)
CO2 SERPL-SCNC: 12 MMOL/L (ref 22–29)
CO2 SERPL-SCNC: 16 MMOL/L (ref 22–29)
CREAT CL PREDICTED SERPL C-G-VRATE: 0 ML/MIN (ref 70–130)
CREAT CL PREDICTED SERPL C-G-VRATE: 109 ML/MIN (ref 70–130)
D DIMER PPP FEU-MCNC: 3.4 *MCG/ML (ref 0.27–0.43)
FIBRINOGEN PPP-MCNC: 87 MG/DL (ref 253–463)
FSP-QUALITATIVE: (no result)
FSP-SEMIQUANTITATIVE: (no result) MCG/ML (ref ?–5)
GLOBULIN SER CALC-MCNC: 1.8 G/DL (ref 2.4–3.5)
GLUCOSE SERPL-MCNC: 117 MG/DL (ref 70–105)
GLUCOSE SERPL-MCNC: 77 MG/DL (ref 70–105)
HGB BLD-MCNC: 3.3 G/DL (ref 12–16)
HGB BLD-MCNC: 8.2 G/DL (ref 12–16)
INR PPP: 2.7
INR PPP: 3
LDH1 SERPL-CCNC: 2313 U/L (ref 125–220)
LIPASE SERPL-CCNC: 144 U/L (ref 8–78)
MCH RBC QN AUTO: 29.4 PG (ref 27–31)
MCH RBC QN AUTO: 31.6 PG (ref 27–31)
MCV RBC AUTO: 93.1 FL (ref 81–99)
MCV RBC AUTO: 94.9 FL (ref 81–99)
MDIFF COMPLETE?: YES
MDIFF COMPLETE?: YES
MICROCYTES BLD QL SMEAR: (no result) (100X)
PLATELET # BLD AUTO: 94 THOU/UL (ref 130–400)
PLATELET # BLD AUTO: 96 THOU/UL (ref 130–400)
PLATELET # BLD AUTO: 98 THOU/UL (ref 130–400)
PLATELET BLD QL SMEAR: (no result)
PLATELET BLD QL SMEAR: (no result)
POLYCHROMASIA BLD QL SMEAR: (no result) (100X)
POLYCHROMASIA BLD QL SMEAR: (no result) (100X)
POTASSIUM SERPL-SCNC: 4.8 MMOL/L (ref 3.5–5.1)
POTASSIUM SERPL-SCNC: 4.9 MMOL/L (ref 3.5–5.1)
PROTHROMBIN TIME: 29.3 SEC (ref 12–14.7)
PROTHROMBIN TIME: 32.2 SEC (ref 12–14.7)
RBC # BLD AUTO: 1.1 MILL/UL (ref 4.2–5.4)
RBC # BLD AUTO: 2.61 MILL/UL (ref 4.2–5.4)
RETICS/RBC NFR: 7.7 % (ref 0.5–1.5)
SODIUM SERPL-SCNC: 128 MMOL/L (ref 136–145)
SODIUM SERPL-SCNC: 130 MMOL/L (ref 136–145)
WBC # BLD AUTO: 20.9 THOU/UL (ref 4.8–10.8)
WBC # BLD AUTO: 22.8 THOU/UL (ref 4.8–10.8)

## 2018-03-12 PROCEDURE — 30233N1 TRANSFUSION OF NONAUTOLOGOUS RED BLOOD CELLS INTO PERIPHERAL VEIN, PERCUTANEOUS APPROACH: ICD-10-PCS | Performed by: HOSPITALIST

## 2018-03-12 RX ADMIN — Medication SCH: at 09:16

## 2018-03-12 RX ADMIN — Medication SCH ML: at 20:51

## 2018-03-12 RX ADMIN — HYDROCODONE BITARTRATE AND ACETAMINOPHEN PRN TAB: 5; 325 TABLET ORAL at 16:07

## 2018-03-12 NOTE — CON
DATE OF CONSULTATION:  03/12/2018

 

REASON FOR CONSULTATION:  Severe anemia and metastatic breast cancer.

 

HISTORY OF PRESENT ILLNESS:  The patient is a 32-year-old young woman who delivered a child brandon cordero on 02/08/2018.  Approximately 3 months prior to delivery, she noted a small mass in the left hien
st and imaging suggested mastitis.  She developed increasing back pain near the end of pregnancy whic
h was attributed to her pregnancy.  She was gaining weight appropriately.  She delivered vaginally wi
thout complications and several days later was found to have metastatic disease in lumbosacral spine 
by MRI.  Ultimately, a biopsy of the left breast mass was performed and showed ER/GA negative, HER-2 
positive infiltrating ductal carcinoma.  She was then found to have widespread liver and bone metasta
sis with malignant ascites.  Within the last month, combination chemotherapy consisting of Perjeta, H
erceptin, and Taxotere was initiated.  She has tolerated the treatment reasonably well, but continues
 to have difficulties with increasing abdominal girth and lower extremity edema.  She presented to [facility] hospital with severe weakness and was found to have a hemoglobin of 3.3.  The white count was 22.8 
with a left shift.  Chemistries showed sodium 130, potassium 4.8, chloride 96, carbon dioxide 12.  Th
e creatinine is 0.89 and BUN 37.  Lactic acid was 15.  Liver function studies were significantly wors
e manifested by a bilirubin of 5.1, which was about 2 within the last few weeks.  AST 1675, , 
alkaline phosphatase 1096 and creatinine kinase 2092.  The albumin is 1.7.  She was admitted for schulz
sfusion and further evaluation.  Cultures have been obtained and antibiotics initiated.  I am asked t
o see the patient to provide further management recommendations.

 

ALLERGIES:  None.

 

MEDICATIONS:  Lasix, Norco and Zofran.

 

MEDICAL ILLNESS:  She has been quite healthy with no significant medical problems.  

 

SURGERIES:  She had a D&C in 2011 and 2017.

 

SOCIAL HISTORY:  She is  and her  is at the bedside.  She has 3 children.  She has neve
r smoked and does not drink alcohol excessively.  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

 

REVIEW OF SYSTEMS:  She is quite weak and frail.  She complains of fatigue, hoarseness, abdominal dis
comfort secondary to increased abdominal girth, back pain and some itching.

 

PHYSICAL EXAMINATION:

VITAL SIGNS:  Temperature 98.1, pulse 104, respirations 20, blood pressure 124/56.

GENERAL:  The patient is a frail and weak woman who is pale.  She is in no acute distress.  She is al
ert, oriented, and appropriate in conversation.

HEENT:  The extraocular movements are intact and the pupils are equal, round, and reactive to light. 
 There is scleral icterus.

NECK:  Supple.

LUNGS:  Clear.

CARDIOVASCULAR:  Regular rate and rhythm without murmur, gallop or click.

ABDOMEN:  The abdomen is distended, but nontender.  There is evidence of ascites.  I feel no mass or 
hepatomegaly.

EXTREMITIES:  No clubbing or cyanosis.  There is 3+ edema to the thighs.

LYMPH:  No adenopathy.

MUSCULOSKELETAL:  No active arthritis.

NEUROLOGIC:  No focal findings.

 

LABORATORY AND X-RAY FINDINGS:  See history of present illness.

 

IMAGING:  A chest x-ray was done and report is pending.

 

IMPRESSION:

1.  Widespread metastatic breast cancer with bone involvement, liver metastasis, and malignant ascite
s.

2.  Worsening liver function studies and markedly elevated creatinine kinase, suggesting rhabdomyolys
is.

3.  Severe lactic acidosis.

 

RECOMMENDATIONS:  The patient will be transfused and reassessed.  IV bicarbonate will be administered
.  Unfortunately, the laboratory studies suggest progression in liver unless this is secondary to ane
ochoa and general debility.  Her prognosis is extremely poor.

 

Thanks very much for allowing me to provide my recommendations.  I will follow with you while she is 
hospitalized.

## 2018-03-12 NOTE — CON
DATE OF CONSULTATION:  03/12/2018

 

HISTORY OF PRESENT ILLNESS:  Lisa Moran is a 32-year-old female admitted to the hospital with hali
ed weakness and shoulder pain, pain all over.  Unfortunate situation here for this 32-year-old female
 who recently diagnosed to have extensive metastatic breast cancer that is well outlined by the oncol
ogist.  She is seeing Dr. Murphy. 

 

The left breast mass was found during a pregnancy.  She had an uneventful delivery of her child on 02
/08/2018.

 

Post-delivery she was noted to have extensive metastatic disease.  Breast mass shows HER2-positive in
filtrative ductal carcinoma with widespread liver and bone metastases, and malignant ascites.  She un
derwent paracentesis almost 2 liters several weeks ago at [facility].

 

She has now had increasing liver function with a bilirubin elevated in the 5 range.

 

She is nauseated.  She feels somewhat better, feeling markedly weak.

 

SOCIAL HISTORY:  Nonsmoker, nondrinker.

 

PAST MEDICAL HISTORY:  Unremarkable for any major medical problems, diabetes, hypertension.

 

PREVIOUS SURGERIES:  D&C.

 

CURRENT MEDICATIONS:  Lasix, Norco, Zofran.

 

SOCIAL AND FAMILY HISTORY:  Unremarkable.  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
neer.

 

REVIEW OF SYSTEMS:  Otherwise, 10-point negative.

 

PHYSICAL EXAMINATION:

GENERAL:  Frail, cachectic looking female.

VITAL SIGNS:  Blood pressure of 120/56, SATs 100%, pulse 104, temperature 98.

EXTREMITIES:  2+ edema.

CHEST:  Decreased breath sounds bilaterally, left greater than right.

CARDIAC:  Normal S1, S2, no gallops.

ABDOMEN:  Distended, ascites.

 

LABORATORY DATA:  White count 20,000, platelet count is 98.  Creatinine normal.  Sodium 128.  LDH is 
2313.  Bilirubin is 5.1.  , , alkaline phosphatase 1096.  Lactic acid was elevated at 5
9.2.

 

X-ray shows left-sided pleural effusion.  Shoulder x-ray showed unremarkable.

 

IMPRESSION:

1.  Metastatic breast cancer.

2.  Left pleural effusion.

3.  Ascites.

4.  Peripheral edema from the abnormal liver function.

 

At this stage, nothing additional to offer.  Continue as per Oncology.  PT and supportive care.

 

I will follow while in the MICU.  Additionally, if the pleural effusion gets larger, may consider doi
ng a thoracentesis.

 

This is a consultation note 70 minutes of which 50% of time was spent with direct patient care.

## 2018-03-12 NOTE — RAD
CHEST 1 VIEW:

 

Date:  03/12/18 

 

HISTORY:  

Pain. 

 

COMPARISON:  

Chest radiograph dated 03/05/18. 

 

FINDINGS:

There is a layering left pleural effusion. Multiple lytic foci of the thoracic spine. No displaced ri
b fracture. Lytic rib metastases are present. 

 

IMPRESSION: 

1.  Layering left pleural effusion. 

2.  Osseous metastatic disease. 

 

 

POS: [facility]

## 2018-03-12 NOTE — RAD
LEFT SHOULDER 3 VIEWS:

 

Date:  03/11/18 

 

HISTORY:  

Shoulder pain. 

 

COMPARISON:  

None. 

 

FINDINGS:

There is no acute fracture or malalignment. Acromioclavicular alignment is normal. Layering left effu
kathy. Scapula intact. 

 

IMPRESSION: 

1.  No acute fracture or malalignment of the left shoulder. 

2.  Layering left pleural effusion. 

 

 

POS: [facility]

## 2018-03-12 NOTE — PDOC.PN
- Subjective


Encounter Start Date: 03/12/18


Encounter Start Time: 10:15


Subjective: pt up in bed has pain all over





- Objective


Vital Signs & Weight: 


 Vital Signs (12 hours)











  Temp Pulse Resp BP Pulse Ox


 


 03/12/18 11:45  97.4 F L  100  18  129/56 L 


 


 03/12/18 09:20  98.1 F  104 H  20   99


 


 03/12/18 08:23  98.1 F  104 H  20  124/56 L  100








 Weight











Weight                         172 lb 8 oz














I&O: 


 











 03/11/18 03/12/18 03/13/18





 06:59 06:59 06:59


 


Intake Total  0 


 


Balance  0 











Result Diagrams: 


 03/12/18 09:15





 03/12/18 09:15





Phys Exam





- Physical Examination


HEENT: PERRLA


Neck: no nodes


Respiratory: no wheezing, no rales


Cardiovascular: RRR, no significant murmur


mild abd pain all over


Neurological: non-focal





Dx/Plan





- Plan





* .


1) New dx breast ca 


2) Acute anemia possible hemolytic vs acute blood loss


3) elevated lfts


4) thrombocytopenia





plan: pt did receive chemo a month ago. She does have metastatic disease. pt 

received 2units of blood.


her lfts are elevated possible due to mets. 


pt's overall prognosis is grim. will talk to pt tomorrow about code status. 





Review of Systems





- Review of Systems


Eyes: negative: Pain, Vision Change, Conjunctivae Inflammation, Eyelid 

Inflammation, Redness, Other


ENT: negative: Ear Pain, Ear Discharge, Nose Pain, Nose Discharge, Nose 

Congestion, Mouth Pain, Mouth Swelling, Throat Pain, Throat Swelling, Other


Respiratory: negative: Cough, Dry, Shortness of Breath, Hemoptysis, SOB with 

Excertion, Pleuritic Pain, Sputum, Wheezing


Cardiovascular: negative: chest pain, palpitations, orthopnea, paroxysmal 

nocturnal dyspnea, edema, light headedness, other


Gastrointestinal: negative: Nausea, Vomiting, Abdominal Pain, Diarrhea, 

Constipation, Melena, Hematochezia, Other


Genitourinary: negative: Dysuria, Frequency, Incontinence, Hematuria, Retention

, Other





- Medications/Allergies


Allergies/Adverse Reactions: 


 Allergies











Allergy/AdvReac Type Severity Reaction Status Date / Time


 


No Known Drug Allergies Allergy   Verified 02/22/18 11:28











Medications: 


 Current Medications





Acetaminophen (Tylenol)  650 mg PO Q4H PRN


   PRN Reason: Headache/Fever or Pain


Hydrocodone Bitart/Acetaminophen (Norco 5/325)  1 tab PO Q4H PRN


   PRN Reason: Moderate Pain (4-6)


   Last Admin: 03/12/18 16:07 Dose:  1 tab


Docusate Sodium (Colace)  100 mg PO BID [facility]


   Last Admin: 03/12/18 09:15 Dose:  100 mg


Famotidine (Pepcid)  20 mg PO BID [facility]


   Last Admin: 03/12/18 09:15 Dose:  20 mg


Dextrose/Sodium Chloride (D5 1/2 Ns)  1,000 mls @ 75 mls/hr IV .[id number] [facility]


   Last Admin: 03/12/18 03:44 Dose:  1,000 mls


Piperacillin Sod/Tazobactam (Sod 3.375 gm/ Sodium Chloride)  100 mls @ 200 mls/

hr IVPB 0300,0900,1500,2100 [facility]


   Last Admin: 03/12/18 15:45 Dose:  100 mls


Magnesium Hydroxide (Milk Of Magnesium)  30 ml PO DAILYPRN PRN


   PRN Reason: Constipation


Morphine Sulfate (Morphine)  2 mg SLOW IVP Q4H PRN


   PRN Reason: Severe Pain (7-10)


   Last Admin: 03/12/18 15:46 Dose:  2 mg


Ondansetron HCl (Zofran)  4 mg IVP Q6H PRN


   PRN Reason: Nausea/Vomiting


Sodium Chloride (Flush - Normal Saline)  10 ml IVF Q12HR [facility]


   Last Admin: 03/12/18 09:16 Dose:  Not Given


Sodium Chloride (Flush - Normal Saline)  10 ml IVF PRN PRN


   PRN Reason: Saline Flush


   Last Admin: 03/12/18 03:45 Dose:  10 ml

## 2018-03-12 NOTE — HP
PRIMARY CARE PHYSICIAN:  Edwige Cary MD

 

PRESENTING COMPLAINT:  Feel weak.

 

HISTORY OF PRESENT ILLNESS:  

Ms. Dean Gonzalez is a 32-year-old female with stage IV breast cancer with 
metastasis to multiple sites, who presents to the emergency room with 
complaints of increased lethargy, soreness, poor appetite, and shortness of 
breath on minimal exertion.  There is no history of fever, chills, SOB or 
cough.  She has no chest pain, abdominal or urinary symptoms.  Her last 
chemotherapy was about 2 weeks ago on 02/28/2018. At the ER, she was hypothermic
, tachycardic, had leucocytosis and elevated lactate. She was then admitted for 
severe symptomatic anemia and sepsis.

 

PAST MEDICAL HISTORY:  Stage IV breast cancer.

 

PAST SURGICAL HISTORY:  D&C after a miscarriage.

 

FAMILY HISTORY:  Reviewed and noncontributory.

 

SOCIAL HISTORY:  Does not smoke cigarettes, drink alcohol, or use illicit drugs.

 

ALLERGIES:  None.

 

REVIEW OF SYSTEMS:  A 12-point review of systems conducted and negative apart 
from as stated in the HPI.

 

HOME MEDICATIONS:

Reviewed and charted. 



PHYSICAL EXAMINATION:

VITAL SIGNS:  Patient is hemodynamically stable.

GENERAL:  Lethargic young lady in mild distress from pain.

HEENT:  Normocephalic, atraumatic.  Pale, icteric.  Dry mucous membranes.  
PERRLA, EOMI.

RESPIRATORY:  Vesicular breath sounds bilaterally.  No wheezes or rales.

CARDIOVASCULAR:  S1 and S2 only.  Regular rate and rhythm.  No murmurs, rubs, 
or gallops.

ABDOMEN:  Full, soft, nontender, nondistended.  No organomegaly.  Bowel sounds 
are present.

MUSCULOSKELETAL:  No skeletal edema.  Minimal lower extremity edema bilaterally.

SKIN:  Icteric, pale.

EXTREMITIES:  Cool extremities.  Pulses present 2+.

NEUROLOGIC:  Lethargic, but is well oriented to time, place, and person.  No 
focal deficits.

PSYCHIATRIC:  Normal mood and affect.

 

LABORATORY DATA:  Significant labs include lactic acid of 15, carbon dioxide at 
12, elevated transaminases (this has been chronic), hemoglobin of 3.3.  WBC of 
22.8, platelet count of 94,000.



CXR: No infiltrates. 

 

ASSESSMENT AND PLAN:

1.  Sepsis: She was hypothermic, tachycardic, had leucocytosis and elevated 
lactate, making sepsis a legitimate concern. Started on IVF, cultures taken and 
Zosyn started for now. Will obtain a repeat lactate level and f/u blood 
cultures. The patient will be admitted to the IM.



2. Symptomatic anemia.  The patient has severe anemia.  Two units of PRBC have 
been ordered, we will transfuse and repeat hemoglobin after transfusion.  
Oncology consult.  



3.  Thrombocytopenia.  Patient has had easy bruising, but no overt bleeding for 
now.  We will monitor platelets.  Place on SCDs and avoid heparin based 
products.



4.  Stage IV carcinoma of the breast.  We would ensure adequate pain control 
while on admission.



5.  Volume depletion.  The patient has dry mucous membranes.  We will gently 
hydrate and monitor vital signs closely.

 

MTDD

## 2018-03-13 LAB
ALBUMIN SERPL BCG-MCNC: 1.6 G/DL (ref 3.5–5)
ALP SERPL-CCNC: 1085 U/L (ref 40–150)
ALT SERPL W P-5'-P-CCNC: 939 U/L (ref 8–55)
ANION GAP SERPL CALC-SCNC: 13 MMOL/L (ref 10–20)
ANION GAP SERPL CALC-SCNC: 9 MMOL/L (ref 10–20)
ANISOCYTOSIS BLD QL SMEAR: (no result) (100X)
AST SERPL-CCNC: (no result) U/L (ref 5–34)
BILIRUB SERPL-MCNC: 7.1 MG/DL (ref 0.2–1.2)
BUN SERPL-MCNC: 36 MG/DL (ref 7–18.7)
BUN SERPL-MCNC: 37 MG/DL (ref 7–18.7)
CALCIUM SERPL-MCNC: 6.6 MG/DL (ref 7.8–10.44)
CALCIUM SERPL-MCNC: 6.6 MG/DL (ref 7.8–10.44)
CHLORIDE SERPL-SCNC: 94 MMOL/L (ref 98–107)
CHLORIDE SERPL-SCNC: 94 MMOL/L (ref 98–107)
CK SERPL-CCNC: 2310 U/L (ref 29–168)
CO2 SERPL-SCNC: 23 MMOL/L (ref 22–29)
CO2 SERPL-SCNC: 25 MMOL/L (ref 22–29)
CREAT CL PREDICTED SERPL C-G-VRATE: 132 ML/MIN (ref 70–130)
CREAT CL PREDICTED SERPL C-G-VRATE: 136 ML/MIN (ref 70–130)
GLOBULIN SER CALC-MCNC: 1.8 G/DL (ref 2.4–3.5)
GLUCOSE SERPL-MCNC: 118 MG/DL (ref 70–105)
GLUCOSE SERPL-MCNC: 119 MG/DL (ref 70–105)
HGB BLD-MCNC: 7.2 G/DL (ref 12–16)
MACROCYTES BLD QL SMEAR: (no result) (100X)
MCH RBC QN AUTO: 30.7 PG (ref 27–31)
MCV RBC AUTO: 92.1 FL (ref 81–99)
MDIFF COMPLETE?: YES
PLATELET # BLD AUTO: 108 THOU/UL (ref 130–400)
PLATELET BLD QL SMEAR: (no result)
POLYCHROMASIA BLD QL SMEAR: (no result) (100X)
POTASSIUM SERPL-SCNC: 4.2 MMOL/L (ref 3.5–5.1)
POTASSIUM SERPL-SCNC: 4.3 MMOL/L (ref 3.5–5.1)
RBC # BLD AUTO: 2.34 MILL/UL (ref 4.2–5.4)
SODIUM SERPL-SCNC: 124 MMOL/L (ref 136–145)
SODIUM SERPL-SCNC: 126 MMOL/L (ref 136–145)
WBC # BLD AUTO: 24.9 THOU/UL (ref 4.8–10.8)

## 2018-03-13 RX ADMIN — HYDROCODONE BITARTRATE AND ACETAMINOPHEN PRN TAB: 5; 325 TABLET ORAL at 18:29

## 2018-03-13 RX ADMIN — Medication SCH: at 10:09

## 2018-03-13 NOTE — PDOC.PN
- Subjective


Encounter Start Date: 03/13/18


Encounter Start Time: 12:00


Subjective: pt up in bed appears ill, still has some pain to her abdomen


-: spoke with pt's  about  her overall poor prognosis





- Objective


Vital Signs & Weight: 


 Vital Signs (12 hours)











  Temp Pulse Resp BP Pulse Ox


 


 03/13/18 11:30  98.0 F  97  18  103/55 L  95


 


 03/13/18 09:29  98.1 F  96  18   98


 


 03/13/18 07:25  98.1 F  96  18  96/48 L  97


 


 03/13/18 04:00  98.1 F  102 H  16  106/48 L  99








 Weight











Weight                         178 lb 9 oz














I&O: 


 











 03/12/18 03/13/18 03/14/18





 06:59 06:59 06:59


 


Intake Total 0 1875 480


 


Output Total  850 


 


Balance 0 1025 480











Result Diagrams: 


 03/13/18 05:30





 03/13/18 05:30





Phys Exam





- Physical Examination


HEENT: PERRLA, moist MMs


Neck: no nodes, no JVD


Respiratory: no wheezing, no rales


Cardiovascular: RRR, no significant murmur


mild distention, pain on palpaiton all around abd


Musculoskeletal: no edema





Dx/Plan





- Plan





* .


1) New dx breast ca 


2) Acute anemia possible hemolytic vs acute blood loss


3) elevated lfts


4) thrombocytopenia





plan: pt did receive chemo a month ago. She does have metastatic disease. pt 

received 2units of blood.


her lfts are elevated possible due to mets. 


pt's overall prognosis is grim. will talk to pt tomorrow about code status.





3/13 worsening lft's will get hepatic doppler to rule out portal or hepatic 

thrombus. pt's  ct abd/pel scan reviewed which indicated complete replacement 

of liver tissue with metastatic foci. 


thrombocytopenia is improving, hh is stable. palliative team consulted. 

Discussed with pt's  about pt's overall poor prognosis.  





Review of Systems





- Review of Systems


Eyes: negative: Pain, Vision Change, Conjunctivae Inflammation, Eyelid 

Inflammation, Redness, Other


ENT: negative: Ear Pain, Ear Discharge, Nose Pain, Nose Discharge, Nose 

Congestion, Mouth Pain, Mouth Swelling, Throat Pain, Throat Swelling, Other


Respiratory: negative: Cough, Dry, Shortness of Breath, Hemoptysis, SOB with 

Excertion, Pleuritic Pain, Sputum, Wheezing


Cardiovascular: negative: chest pain, palpitations, orthopnea, paroxysmal 

nocturnal dyspnea, edema, light headedness, other


Gastrointestinal: Abdominal Pain





- Medications/Allergies


Allergies/Adverse Reactions: 


 Allergies











Allergy/AdvReac Type Severity Reaction Status Date / Time


 


No Known Drug Allergies Allergy   Verified 02/22/18 11:28











Medications: 


 Current Medications





Acetaminophen (Tylenol)  650 mg PO Q4H PRN


   PRN Reason: Headache/Fever or Pain


Hydrocodone Bitart/Acetaminophen (Norco 5/325)  1 tab PO Q4H PRN


   PRN Reason: Moderate Pain (4-6)


   Last Admin: 03/12/18 16:07 Dose:  1 tab


Docusate Sodium (Colace)  100 mg PO BID [facility]


   Last Admin: 03/13/18 10:07 Dose:  Not Given


Famotidine (Pepcid)  20 mg PO BID [facility]


   Last Admin: 03/13/18 10:08 Dose:  20 mg


Dextrose/Sodium Chloride (D5 1/2 Ns)  1,000 mls @ 75 mls/hr IV .[id number] [facility]


   Last Admin: 03/13/18 01:38 Dose:  Not Given


Piperacillin Sod/Tazobactam (Sod 3.375 gm/ Sodium Chloride)  100 mls @ 200 mls/

hr IVPB 0300,0900,1500,2100 [facility]


   Last Admin: 03/13/18 10:06 Dose:  100 mls


Magnesium Hydroxide (Milk Of Magnesium)  30 ml PO DAILYPRN PRN


   PRN Reason: Constipation


Morphine Sulfate (Morphine)  2 mg SLOW IVP Q4H PRN


   PRN Reason: Severe Pain (7-10)


   Last Admin: 03/13/18 10:08 Dose:  2 mg


Ondansetron HCl (Zofran)  4 mg IVP Q6H PRN


   PRN Reason: Nausea/Vomiting


   Last Admin: 03/13/18 05:26 Dose:  4 mg


Sodium Chloride (Flush - Normal Saline)  10 ml IVF Q12HR [facility]


   Last Admin: 03/13/18 10:09 Dose:  Not Given


Sodium Chloride (Flush - Normal Saline)  10 ml IVF PRN PRN


   PRN Reason: Saline Flush


   Last Admin: 03/12/18 03:45 Dose:  10 ml

## 2018-03-13 NOTE — PRG
DATE OF SERVICE:  03/13/2018

 

This morning she is awake, alert, responsive, appears to be more jaundiced.

 

PHYSICAL EXAMINATION: 

VITAL SIGNS:  Sats are 100% on room air, temperature 98, pulse 96, blood pressure 96/48.

CHEST:  Chest revealed decreased breath sounds, no wheezing.

CARDIAC:  Normal S1-S2.  No gallops.

ABDOMEN:  Soft, no masses. 

 

LABORATORY:  White count 24,000, H&H 7 and 21, platelet count 108.  Sodium 124.  Electrolytes are nor
mal.  Creatinine kinase 2310.

 

IMPRESSION:

1.  Metastatic breast cancer, extensive.

2.  Leukocytosis, but no evidence of any obvious infection.  Culture negative.

3.  Ascites.

4.  Pleural effusion.

 

PLAN:  Comfort care.

 

Empiric antibiotics, supportive care.  She is being transferred out of the IMCU to the Oncology floor
.

## 2018-03-13 NOTE — PQF
DATE:    3-13-18                                                        ATTN:  
DR. CATHERINE BLACKWOOD



Please exercise your independent, professional judgment in responding to the 
clarification form. 

Clinical indicators are provided on the bottom of this form for your review



Please check appropriate box(s) to clarify if the following diagnosis has been 
ruled in or  ruled out:

______________SEPSIS _______________________________________



[  ] Ruled in diagnosis

     [  ] Continue to treat        [  ] Resolved

[  ] Ruled out diagnosis

[  ] Other diagnosis ___________

[  ] Unable to determine



In addition, please specify:

Present on Admission (POA):  [x  ] Yes             [  ] No             [  ] 
Unable to determine



For continuity of documentation, please document condition throughout progress 
notes and discharge summary.  Thank You.



CLINICAL INDICATORS - SIGNS / SYMPTOMS / LABS



H&P:   ADMITTED FOR SEVERE SYMPTOMATIC ANEMIA AND SEPSIS



H&P:   SEPSIS.  SHE WAS HYPOTHERMIC, TACHYCARDIC, HAD LEUCOCYTOSIS AND ELEVATED 
LACTATE, MAKING SEPSIS A

            LEGITIMATE CONCERN.



WBC:   3-11-18:   22.8

           3-12-18:  20.9

           3-13-18:  24.9



BANDS:   3-11-18:   22

               3-12-18:  25

               3-13-18:  15



LACTIC ACID:   3-11-18:   15.0

                        3-12-18:   15.3

                        3-12-18:     9.2



PULSE:   3-12-18:   104,  104,   102,  101

               3-13-18:    102



RISK FACTORS:  CONSULT NOTE DR. ZIMMERMAN:  WIDESPREAD METASTATIC BREAST CANCER 
WITH BONE 

                            INVOLVEMENT, LIVER METASTASIS, AND MALIGNANT ASCITES



TREATMENTS:     (MAR)   IVF,  ZOSYN



(This form is maintained as a part of the permanent medical record)

 2015 [employer].  All Rights Reserved

YE [email or web address]    Office:  [phone]

                                                              

[facility]

## 2018-03-13 NOTE — ULT
HEPATIC SONOGRAM WITH DUPLEX EVALUATION:

3/13/18

 

HISTORY: 

Abnormal liver function tests. Metastatic disease. Abdominal pain.

 

FINDINGS:  

Biliary sludge is apparent within the gallbladder lumen. Patient was not reportedly tender over the g
allbladder fossa at the time of the exam. Common duct is 0.5 cm. 

 

Liver is enlarged and diffusely replaced by multiple irregular hypoechoic masses. Small amount of kristy
e fluid is present within the abdomen. Spleen is 12.6 cm. 

 

Good color and spectral doppler flow present within the hepatic and splenic arteries. 

 

There is no significant flow visualized within the portal vein. There is increased flow within the he
patic artery. Portal venous flow is toward the IVC. 

 

IMPRESSION:  

1.      Thrombosis of the portal venous system.

2.      Extensive metastatic disease of the enlarged liver.

3.      Small amount of ascites. 

 

POS: [facility]

## 2018-03-14 LAB
ALBUMIN SERPL BCG-MCNC: 1.6 G/DL (ref 3.5–5)
ALP SERPL-CCNC: 1152 U/L (ref 40–150)
ALT SERPL W P-5'-P-CCNC: 971 U/L (ref 8–55)
ANION GAP SERPL CALC-SCNC: 9 MMOL/L (ref 10–20)
AST SERPL-CCNC: (no result) U/L (ref 5–34)
BILIRUB DIRECT SERPL-MCNC: 5.2 MG/DL (ref 0.1–0.3)
BILIRUB SERPL-MCNC: 6.7 MG/DL (ref 0.2–1.2)
BUN SERPL-MCNC: 29 MG/DL (ref 7–18.7)
CALCIUM SERPL-MCNC: 6.6 MG/DL (ref 7.8–10.44)
CHLORIDE SERPL-SCNC: 94 MMOL/L (ref 98–107)
CO2 SERPL-SCNC: 25 MMOL/L (ref 22–29)
CREAT CL PREDICTED SERPL C-G-VRATE: 151 ML/MIN (ref 70–130)
GLUCOSE SERPL-MCNC: 104 MG/DL (ref 70–105)
HGB BLD-MCNC: 6.1 G/DL (ref 12–16)
MCH RBC QN AUTO: 30.1 PG (ref 27–31)
MCV RBC AUTO: 92.8 FL (ref 81–99)
MDIFF COMPLETE?: YES
PLATELET # BLD AUTO: 104 THOU/UL (ref 130–400)
PLATELET BLD QL SMEAR: (no result)
POTASSIUM SERPL-SCNC: 4 MMOL/L (ref 3.5–5.1)
RBC # BLD AUTO: 2.01 MILL/UL (ref 4.2–5.4)
SODIUM SERPL-SCNC: 124 MMOL/L (ref 136–145)
WBC # BLD AUTO: 23.5 THOU/UL (ref 4.8–10.8)

## 2018-03-14 PROCEDURE — 30233N1 TRANSFUSION OF NONAUTOLOGOUS RED BLOOD CELLS INTO PERIPHERAL VEIN, PERCUTANEOUS APPROACH: ICD-10-PCS | Performed by: HOSPITALIST

## 2018-03-14 RX ADMIN — Medication SCH ML: at 03:28

## 2018-03-14 RX ADMIN — Medication SCH ML: at 09:21

## 2018-03-14 RX ADMIN — HYDROCODONE BITARTRATE AND ACETAMINOPHEN PRN TAB: 5; 325 TABLET ORAL at 09:24

## 2018-03-14 NOTE — PDOC.PN
- Subjective


Encounter Start Date: 03/14/18


Encounter Start Time: 13:00


Subjective: pt up in bed appears ill





- Objective


Vital Signs & Weight: 


 Vital Signs (12 hours)











  Temp Pulse Pulse Resp BP BP Pulse Ox


 


 03/14/18 18:43  97.5 F L   80  18  108/55 L  


 


 03/14/18 11:39  97.3 F L  73   16   111/53 L  100








 Weight











Weight                         178 lb 9 oz














I&O: 


 











 03/13/18 03/14/18 03/15/18





 06:59 06:59 06:59


 


Intake Total 1875 3234.4 600


 


Output Total 850 625 


 


Balance 1025 2609.4 600











Result Diagrams: 


 03/14/18 05:36





 03/14/18 05:36


Additional Labs: 


 Accuchecks











  03/14/18





  05:33


 


POC Glucose  182 H














Phys Exam





- Physical Examination


HEENT: PERRLA


pt sclera is icteric, conjuntiva pallor


Respiratory: wheezing present


Cardiovascular: RRR, no significant murmur


mild distention and pain on palpation


Musculoskeletal: edema present





Dx/Plan





- Plan





* .


* .


1) New dx breast ca 


2) Acute anemia possible hemolytic vs acute blood loss


3) elevated lfts


4) thrombocytopenia





plan: pt did receive chemo a month ago. She does have metastatic disease. pt 

received 2units of blood.


her lfts are elevated possible due to mets. 


pt's overall prognosis is grim. will talk to pt tomorrow about code status.





3/13 worsening lft's will get hepatic doppler to rule out portal or hepatic 

thrombus. pt's  ct abd/pel scan reviewed which indicated complete replacement 

of liver tissue with metastatic foci. 


thrombocytopenia is improving, hh is stable. palliative team consulted. 

Discussed with pt's  about pt's overall poor prognosis. 





3/14 spoke in length with pt and her family today about her overall prognosis. 

pt understands and will wait for input from oncology and then decide what is 

best for her. hepatic ultrasound indicates portal thrombosis no intervention 

due to her anemia. Pt will be given blood transfusion to keep hh >7 








Review of Systems





- Review of Systems


Eyes: negative: Pain, Vision Change, Conjunctivae Inflammation, Eyelid 

Inflammation, Redness, Other


ENT: negative: Ear Pain, Ear Discharge, Nose Pain, Nose Discharge, Nose 

Congestion, Mouth Pain, Mouth Swelling, Throat Pain, Throat Swelling, Other


Respiratory: Shortness of Breath


Cardiovascular: negative: chest pain, palpitations, orthopnea, paroxysmal 

nocturnal dyspnea, edema, light headedness, other


Gastrointestinal: Abdominal Pain





- Medications/Allergies


Allergies/Adverse Reactions: 


 Allergies











Allergy/AdvReac Type Severity Reaction Status Date / Time


 


No Known Drug Allergies Allergy   Verified 02/22/18 11:28











Medications: 


 Current Medications





Acetaminophen (Tylenol)  650 mg PO Q4H PRN


   PRN Reason: Headache/Fever or Pain


Hydrocodone Bitart/Acetaminophen (Norco 5/325)  1 tab PO Q4H PRN


   PRN Reason: Moderate Pain (4-6)


   Last Admin: 03/14/18 09:24 Dose:  1 tab


Bisacodyl (Dulcolax)  10 mg WA DAILYPRN PRN


   PRN Reason: Constipation


Docusate Sodium (Colace)  100 mg PO BID [facility]


   Last Admin: 03/14/18 09:21 Dose:  100 mg


Famotidine (Pepcid)  20 mg PO BID [facility]


   Last Admin: 03/14/18 09:21 Dose:  20 mg


Fentanyl (Duragesic)  25 mcg TD Q3D [facility]


   Last Admin: 03/13/18 21:58 Dose:  25 mcg


Dextrose/Sodium Chloride (D5 1/2 Ns)  1,000 mls @ 75 mls/hr IV .[id number] [facility]


   Last Admin: 03/14/18 18:22 Dose:  1,000 mls


Piperacillin Sod/Tazobactam (Sod 3.375 gm/ Sodium Chloride)  100 mls @ 200 mls/

hr IVPB 0300,0900,1500,2100 [facility]


   Last Admin: 03/14/18 15:48 Dose:  100 mls


Magnesium Hydroxide (Milk Of Magnesium)  30 ml PO DAILYPRN PRN


   PRN Reason: Constipation


   Last Admin: 03/14/18 09:25 Dose:  30 ml


Morphine Sulfate (Morphine)  2 mg SLOW IVP Q2H PRN


   PRN Reason: Severe Pain (7-10)


   Last Admin: 03/13/18 20:03 Dose:  2 mg


Ondansetron HCl (Zofran)  4 mg IVP Q6H PRN


   PRN Reason: Nausea/Vomiting


   Last Admin: 03/14/18 19:20 Dose:  4 mg


Sodium Chloride (Flush - Normal Saline)  10 ml IVF Q12HR [facility]


   Last Admin: 03/14/18 09:21 Dose:  10 ml


Sodium Chloride (Flush - Normal Saline)  10 ml IVF PRN PRN


   PRN Reason: Saline Flush


   Last Admin: 03/12/18 03:45 Dose:  10 ml

## 2018-03-14 NOTE — PRG
DATE OF SERVICE:  03/14/2018

 

She continues to have pain and discomfort in the shoulder, back, but denies difficulty breathing.  

 

PHYSICAL EXAMINATION: 

VITAL SIGNS:  Sats are 90% on room air, respiration 16, temperature 98, blood pressure 90/55.

SKIN:  She is obviously jaundiced.

CHEST:  Chest reveals decreased breath sounds, no wheezing.

CARDIAC:  Normal S1, S2. 

ABDOMEN:   Abdomen soft.  

 

LABORATORY:  Bands 25, 49 segs.  White count 23,000.  Sodium 124.

 

She had ultrasound of the abdomen ordered yesterday, shows thrombus of the portal venous system, exte
nsive metastatic disease of the liver, ascites.  

 

IMPRESSION:

1.  Extensive metastatic carcinoma.  

2.  Ascites.  

3.  Pleural effusion.

4.  Thrombus of the portal system.

 

PLAN:  Comfort care.

 

Input from Oncology.

 

Prognosis is grave.

 

Pulmonary will see as needed.

## 2018-03-15 VITALS — DIASTOLIC BLOOD PRESSURE: 58 MMHG | TEMPERATURE: 97.5 F | SYSTOLIC BLOOD PRESSURE: 115 MMHG

## 2018-03-15 LAB
ANION GAP SERPL CALC-SCNC: 9 MMOL/L (ref 10–20)
BUN SERPL-MCNC: 21 MG/DL (ref 7–18.7)
CALCIUM SERPL-MCNC: 6.8 MG/DL (ref 7.8–10.44)
CHLORIDE SERPL-SCNC: 95 MMOL/L (ref 98–107)
CO2 SERPL-SCNC: 26 MMOL/L (ref 22–29)
CREAT CL PREDICTED SERPL C-G-VRATE: 184 ML/MIN (ref 70–130)
GLUCOSE SERPL-MCNC: 105 MG/DL (ref 70–105)
HGB BLD-MCNC: 9.4 G/DL (ref 12–16)
MCH RBC QN AUTO: 30.9 PG (ref 27–31)
MCV RBC AUTO: 93 FL (ref 81–99)
MDIFF COMPLETE?: YES
PLATELET # BLD AUTO: 83 THOU/UL (ref 130–400)
PLATELET BLD QL SMEAR: (no result)
POLYCHROMASIA BLD QL SMEAR: (no result) (100X)
POTASSIUM SERPL-SCNC: 4.1 MMOL/L (ref 3.5–5.1)
RBC # BLD AUTO: 3.04 MILL/UL (ref 4.2–5.4)
SODIUM SERPL-SCNC: 126 MMOL/L (ref 136–145)
WBC # BLD AUTO: 24.8 THOU/UL (ref 4.8–10.8)

## 2018-03-15 RX ADMIN — Medication SCH ML: at 09:57

## 2018-03-15 RX ADMIN — Medication SCH: at 01:56

## 2018-03-15 NOTE — PDOC.PN
- Subjective


Encounter Start Date: 03/15/18


Encounter Start Time: 09:45


Subjective: pt up in bed has minimal pain





- Objective


Vital Signs & Weight: 


 Vital Signs (12 hours)











  Temp Pulse Resp BP Pulse Ox


 


 03/15/18 08:00  97.5 F L  95  18  115/58 L  98


 


 03/15/18 04:00  98.0 F  74  16  122/60  100








 Weight











Weight                         178 lb 9 oz














I&O: 


 











 03/14/18 03/15/18 03/16/18





 06:59 06:59 06:59


 


Intake Total 3234.4 2700 525


 


Output Total 625 650 


 


Balance 2609.4 2050 525











Result Diagrams: 


 03/15/18 06:45





 03/15/18 06:45





Phys Exam





- Physical Examination


HEENT: PERRLA


Neck: no nodes


Respiratory: no wheezing, no rales


Cardiovascular: RRR, no significant murmur


mild disention, bsx2, pain on palpaiton


Musculoskeletal: no edema


Neurological: non-focal, normal sensation





Dx/Plan





- Plan





) New dx breast ca 


2) Acute anemia possible hemolytic vs acute blood loss


3) elevated lfts


4) thrombocytopenia





plan: pt did receive chemo a month ago. She does have metastatic disease. pt 

received 2units of blood.


her lfts are elevated possible due to mets. 


pt's overall prognosis is grim. will talk to pt tomorrow about code status.





3/13 worsening lft's will get hepatic doppler to rule out portal or hepatic 

thrombus. pt's  ct abd/pel scan reviewed which indicated complete replacement 

of liver tissue with metastatic foci. 


thrombocytopenia is improving, hh is stable. palliative team consulted. 

Discussed with pt's  about pt's overall poor prognosis. 





3/14 spoke in length with pt and her family today about her overall prognosis. 

pt understands and will wait for input from oncology and then decide what is 

best for her. hepatic ultrasound indicates portal thrombosis no intervention 

due to her anemia. Pt will be given blood transfusion to keep hh >7 








pt is going home with hospice


* .

## 2018-03-15 NOTE — PQF
DATE:    3-13-18                                                        ATTN:  
DR. CATHERINE BLACKWOOD



Please exercise your independent, professional judgment in responding to the 
clarification form. 

Clinical indicators are provided on the bottom of this form for your review



Please check appropriate box(s) to clarify if the following diagnosis has been 
ruled in or  ruled out:

______________SEPSIS _______________________________________



[  ] Ruled in diagnosis

     [  ] Continue to treat        [ x ] Resolved

[  ] Ruled out diagnosis

[  ] Other diagnosis ___________

[  ] Unable to determine



In addition, please specify:

Present on Admission (POA):  [x  ] Yes             [  ] No             [  ] 
Unable to determine



For continuity of documentation, please document condition throughout progress 
notes and discharge summary.  Thank You.



CLINICAL INDICATORS - SIGNS / SYMPTOMS / LABS



H&P:   ADMITTED FOR SEVERE SYMPTOMATIC ANEMIA AND SEPSIS



H&P:   SEPSIS.  SHE WAS HYPOTHERMIC, TACHYCARDIC, HAD LEUCOCYTOSIS AND ELEVATED 
LACTATE, MAKING SEPSIS A

            LEGITIMATE CONCERN.



WBC:   3-11-18:   22.8

           3-12-18:  20.9

           3-13-18:  24.9



BANDS:   3-11-18:   22

               3-12-18:  25

               3-13-18:  15



LACTIC ACID:   3-11-18:   15.0

                        3-12-18:   15.3

                        3-12-18:     9.2



PULSE:   3-12-18:   104,  104,   102,  101

               3-13-18:    102



RISK FACTORS:   CONSULT NOTE DR. ZIMMERMAN:  WIDESPREAD METASTATIC BREAST CANCER 
WITH BONE 

                             INVOLVEMENT, LIVER METASTASIS, AND MALIGNANT 
ASCITES



TREATMENTS:     (MAR)   IVF,  ZOSYN



(This form is maintained as a part of the permanent medical record)

 2015 [employer]  All Rights Reserved

YE [email or web address]    Office:  [phone]

                                                              

[facility]

## 2018-03-15 NOTE — DIS
DATE OF ADMISSION:  03/12/2018

 

DATE OF DISCHARGE:  03/15/2018

 

DISCHARGE DIAGNOSES:

1.  Breast cancer with metastatic to bone and liver.

2.  Shock liver, most likely secondary to the underlying disease process.

3.  Leukocytosis.

4.  Sepsis on admission; however, resolved upon discharge.

5.  Possible disseminated intravascular coagulation.

6.  Anemia.

7.  Thrombocytopenia.

 

HOSPITAL COURSE:  The patient is a very unfortunate 32-year-old patient who initially was admitted to
 the hospital with weakness.  The patient was found to have anemia and was given status post 3 units 
of blood transfusion.  She was also found to have a worsening elevation of her LFTs.  The patient had
 recently had chemotherapy about 2 weeks ago on the 02/28/2018.  Patient initially was started with b
road spectrum antibiotics for possible sepsis on admission; however, there was no indication of infec
tion.  This was most likely secondary to her underlying reaction from the chemotherapy and her cancer
.  The patient was seen by Oncology and also Pulmonology.  Patient's code status was discussed with t
he patient and patient's family about the patient's overall poor prognosis and poor outcome.  The pat
ient decided to go home with hospice.  Hospice at that time was initiated and the patient has been di
scharged home with hospice.

 

DISCHARGE MEDICATIONS:  As following:  Hydrocodone 1 p.o. q.6. p.r.n., Fentanyl patch 25 mcg t.i.d. e
very 3 days, hydrocodone/[place] 1 q.4 h. p.r.n., Pepcid 20 mg b.i.d., Colace 100 mg p.o. b.i.d.

 

Consultants were Pulmonology and Oncology.

## 2018-03-26 ENCOUNTER — HOSPITAL ENCOUNTER (OUTPATIENT)
Dept: HOSPITAL 92 - ONC | Age: 33
Setting detail: OBSERVATION
LOS: 1 days | Discharge: HOME | End: 2018-03-27
Attending: INTERNAL MEDICINE | Admitting: INTERNAL MEDICINE
Payer: COMMERCIAL

## 2018-03-26 VITALS — BODY MASS INDEX: 33.7 KG/M2

## 2018-03-26 DIAGNOSIS — C78.7: ICD-10-CM

## 2018-03-26 DIAGNOSIS — E44.0: ICD-10-CM

## 2018-03-26 DIAGNOSIS — E83.42: ICD-10-CM

## 2018-03-26 DIAGNOSIS — R18.0: ICD-10-CM

## 2018-03-26 DIAGNOSIS — C50.412: Primary | ICD-10-CM

## 2018-03-26 DIAGNOSIS — Z17.1: ICD-10-CM

## 2018-03-26 DIAGNOSIS — Z79.899: ICD-10-CM

## 2018-03-26 DIAGNOSIS — E87.1: ICD-10-CM

## 2018-03-26 DIAGNOSIS — K72.90: ICD-10-CM

## 2018-03-26 DIAGNOSIS — D63.0: ICD-10-CM

## 2018-03-26 DIAGNOSIS — E87.6: ICD-10-CM

## 2018-03-26 LAB
ANION GAP SERPL CALC-SCNC: 9 MMOL/L (ref 10–20)
BUN SERPL-MCNC: 13 MG/DL (ref 7–18.7)
CALCIUM SERPL-MCNC: 6.5 MG/DL (ref 7.8–10.44)
CHLORIDE SERPL-SCNC: 95 MMOL/L (ref 98–107)
CO2 SERPL-SCNC: 24 MMOL/L (ref 22–29)
CREAT CL PREDICTED SERPL C-G-VRATE: 223 ML/MIN (ref 70–130)
GLUCOSE SERPL-MCNC: 101 MG/DL (ref 70–105)
MAGNESIUM SERPL-MCNC: 1.5 MG/DL (ref 1.6–2.6)
POTASSIUM SERPL-SCNC: 3.3 MMOL/L (ref 3.5–5.1)
SODIUM SERPL-SCNC: 125 MMOL/L (ref 136–145)

## 2018-03-26 PROCEDURE — 85730 THROMBOPLASTIN TIME PARTIAL: CPT

## 2018-03-26 PROCEDURE — 86850 RBC ANTIBODY SCREEN: CPT

## 2018-03-26 PROCEDURE — 84100 ASSAY OF PHOSPHORUS: CPT

## 2018-03-26 PROCEDURE — P9047 ALBUMIN (HUMAN), 25%, 50ML: HCPCS

## 2018-03-26 PROCEDURE — G0378 HOSPITAL OBSERVATION PER HR: HCPCS

## 2018-03-26 PROCEDURE — 36430 TRANSFUSION BLD/BLD COMPNT: CPT

## 2018-03-26 PROCEDURE — 86901 BLOOD TYPING SEROLOGIC RH(D): CPT

## 2018-03-26 PROCEDURE — 96376 TX/PRO/DX INJ SAME DRUG ADON: CPT

## 2018-03-26 PROCEDURE — 85610 PROTHROMBIN TIME: CPT

## 2018-03-26 PROCEDURE — 36415 COLL VENOUS BLD VENIPUNCTURE: CPT

## 2018-03-26 PROCEDURE — 80048 BASIC METABOLIC PNL TOTAL CA: CPT

## 2018-03-26 PROCEDURE — 85025 COMPLETE CBC W/AUTO DIFF WBC: CPT

## 2018-03-26 PROCEDURE — P9016 RBC LEUKOCYTES REDUCED: HCPCS

## 2018-03-26 PROCEDURE — 49083 ABD PARACENTESIS W/IMAGING: CPT

## 2018-03-26 PROCEDURE — 80053 COMPREHEN METABOLIC PANEL: CPT

## 2018-03-26 PROCEDURE — 86900 BLOOD TYPING SEROLOGIC ABO: CPT

## 2018-03-26 PROCEDURE — 96367 TX/PROPH/DG ADDL SEQ IV INF: CPT

## 2018-03-26 PROCEDURE — 96413 CHEMO IV INFUSION 1 HR: CPT

## 2018-03-26 PROCEDURE — 83735 ASSAY OF MAGNESIUM: CPT

## 2018-03-26 PROCEDURE — 76705 ECHO EXAM OF ABDOMEN: CPT

## 2018-03-26 PROCEDURE — 96375 TX/PRO/DX INJ NEW DRUG ADDON: CPT

## 2018-03-26 NOTE — HP
DATE OF ADMISSION:  03/26/2018

 

PRIMARY CARE PHYSICIAN:  Dr. Edwige Cary.

 

PRIMARY ONCOLOGIST:  Dr. Murphy.

 

CHIEF COMPLAINT:  Direct admit from Dr. Murphy's office for blood transfusion 
and paracentesis.

 

HISTORY OF PRESENT ILLNESS:  Patient is a 32-year-old female with stage IV 
breast cancer with metastasis, presented as a direct admit from Dr. Murphy's 
office for blood transfusion as well as paracentesis.  Patient was recently 
found to have hemoglobin of 5 per patient report.  She has been short of breath 
on mild to moderate exertion as well as lying down.  No fever or chills 
reported.  She denies any chest pain, shortness of breath, palpitations or 
syncope.

 

PAST MEDICAL HISTORY:

1.  Stage IV breast cancer.

2.  Recent hospitalization for sepsis.

 

PAST SURGICAL HISTORY:

1.  D&C.

2.  Recent childbirth.

3.  Paracentesis.

 

ALLERGIES:  No known drug allergies.

 

CURRENT MEDICATIONS:  Amoxicillin 500 mg 3 times a day, Colace 100 mg daily, 
Pepcid 20 mg b.i.d., fentanyl patch 25 mcg every 3 days, lactulose 15 grams 
daily, Ativan as needed, Zofran as needed, torsemide 10 mg daily.

 

REVIEW OF SYSTEMS:  The following complete review of systems was negative, 
unless otherwise mentioned in the HPI or below:  Constitutional:  Weight loss 
or gain, ability to conduct usual activities.  Skin:  Rash, itching.  Eyes:  
Double vision, pain.  ENT/Mouth:  Nose bleeding, neck stiffness, pain, 
tenderness.  Cardiovascular:  Palpitations, dyspnea on exertion, orthopnea.  
Respiratory:  Shortness of breath, wheezing, cough, hemoptysis, fever or night 
sweats.  Gastrointestinal:  Poor appetite, abdominal pain, heartburn, nausea, 
vomiting, constipation, or diarrhea.  Genitourinary:  Urgency, frequency, 
dysuria, nocturia.  Musculoskeletal:  Pain, swelling.  Neurologic/Psychiatric:  
Anxiety, depression.  Allergy/Immunologic:  Skin rash, bleeding tendency.

 

FAMILY HISTORY:  Negative for premature coronary artery disease.

 

SOCIAL HISTORY:  Patient currently lives at home, has a 6-week-old daughter.  
No smoking, alcohol or drug use.

 

PHYSICAL EXAMINATION:

VITAL SIGNS:  Temperature 98.6, respirations 20, pulse rate of 114, blood 
pressure 110/55, O2 saturation of 99% on room air.

GENERAL:  A 32-year-old female in mild respiratory distress, able to complete 
short sentences.

HEENT:  Head is atraumatic, normocephalic, sclerae are anicteric.  Moist mucous 
membrane, no oral lesion.

NECK:  Supple, no JVD, no carotid bruit.

LUNGS:  Showed decreased air entry at bilateral bases.  No rales or rhonchi.  
No wheezing.

HEART:  S1, S2 present.  Tachycardic, no rubs or gallops.  No murmurs.

ABDOMEN:  Soft, distended, shifting dullness present.  Bowel sounds present, no 
rebound or guarding.

EXTREMITIES:  Bilateral lower extremity edema.

SKIN:  Warm and dry.

LYMPH NODES:  No palpable lymph nodes in the neck.

PERIPHERAL VASCULAR:  Radial pulses palpable bilaterally.

MUSCULOSKELETAL:  No joint swelling or tenderness.

 

LABORATORY FINDINGS:

1.  Recent hemoglobin at Dr. Murphy's office was around 5 per patient report.

2.  Sodium 125, potassium 3.3, chloride 95, bicarbonate 24, BUN 13, creatinine 
0.5.

3.  Phosphorus 2.1, magnesium 1.5.

4.  Abdominal ultrasound 2 weeks ago showed portal vein thrombosis with 
extensive metastatic disease.

 

IMPRESSION:

1.  Symptomatic Ascites.  The patient will undergo paracentesis tomorrow.

2.  Symptomatic anemia.  The patient will receive 2 units of PRBC per Dr. Murphy's recommendation.  We will check CBC in a.m.

3.  Electrolyte abnormality.  The patient has hyponatremia, hypophosphatemia, 
hypomagnesemia and hypokalemia.

4.  Moderate protein-calorie malnutrition.

5.  Abnormal LFTs due to cancer.

 

Plan of care was discussed with the patient and the family at the bedside.  
They stated understanding.

 

MTDD

## 2018-03-26 NOTE — CON
DATE OF CONSULTATION:  03/26/2018

 

REASON FOR CONSULTATION:  Anemia and breast cancer.

 

HISTORY OF PRESENT ILLNESS:  Ms. Moran is a 32-year-old female, who was diagnosed with metastatic andrey
ast cancer after delivery of her child in 02/2018.  She was ER/NE negative, HER-2 positive infiltrati
ng ductal carcinoma.  She had widespread liver and bone mets with malignant ascites.  She received co
mbination chemotherapy with Perjeta, Herceptin, and Taxotere.  She presented to the emergency room sh
ortly thereafter with a hemoglobin of 3.3.  Her liver function studies were significantly worse with 
a bilirubin of 7.  During that admission the decision was made to be placed on hospice, so she has be
en on hospice for the last week or so.  She had labs checked by her Hospice Service and her hemoglobi
n was found to be low at 5.5.  She had increasing abdominal girth and some discomfort.  The decision 
was made to revoke hospice for blood transfusion and paracentesis.  She is being admitted today for t
hose procedures.

 

PAST MEDICAL HISTORY:

1.  Stage IV metastatic infiltrating ductal carcinoma.

2.  Anemia.

3.  Hemorrhoids.

 

PAST SURGICAL HISTORY:  D and C.

 

ALLERGIES:  No known drug allergies.

 

HOME MEDICATIONS:  Duragesic patch for pain, Norco p.r.n.

 

FAMILY HISTORY:  No history of breast cancer.

 

SOCIAL HISTORY:  , has 3 children, lives with her spouse.

 

REVIEW OF SYSTEMS:  Constitutional:  Denies fever, chills or night sweats.  Eyes:  No blurred or doub
le vision.  ENT:  No pain, hoarseness, sore throat or dysphagia.  Cardiovascular:  No chest pain.  Re
spiratory:  No shortness of breath.  Gastrointestinal:  Positive for abdominal distention and discomf
ort.  Skin:  Positive for jaundice.  No rash or itching.  Neurologic:  Positive for difficulty ambula
ting.  Psychiatric:  Denies anxiety or depression.

 

PHYSICAL EXAMINATION:

VITAL SIGNS:  Temperature is 99.4, pulse is 114, respiratory rate 18, and blood pressure is 114/59.  
She is 99% on room air.

GENERAL:  This is an ill-appearing female, in no acute distress.

HEENT:  Normocephalic, atraumatic.  Pupils are equal and reactive to light.  Sclerae are icteric.

NECK:  Supple.

HEART:  Regular rate and rhythm.  She is tachycardic.

LUNGS:  Clear.

ABDOMEN:  Distended with positive ascites.

EXTREMITIES:  She has 1+ bilateral lower extremity edema.

SKIN:  Jaundiced.  No rash.

NEUROLOGIC:  Nonfocal.

PSYCHIATRIC:  The patient is alert and oriented and appropriate.

 

PERTINENT LABORATORY AND X-RAYS:  Show sodium of 125, potassium is 3.3, chloride is 95, CO2 is 24, BU
N is 13, creatinine is 0.5, calcium is 6.5, phosphorus is 2.1, and magnesium is 1.5.

 

ASSESSMENT:

1.  Metastatic ER/NE negative, HER-2 positive breast cancer.

2.  Liver failure with ascites secondary to cancer.

3.  Severe symptomatic anemia secondary to breast cancer.

 

DISCUSSION:  The patient has revoked hospice and is getting 2 units of blood and paracentesis.  She i
s requesting treatment with Herceptin and Perjeta, which will not affect her LFTs.  Plan is to give t
hat to her tomorrow, at which point she will home on hospice in order to be with her family.  Should 
she improve over the next 3 weeks, she will revoke hospice again and resume treatment.  Otherwise, hieu huertas will just remain on hospice.

 

Thank you for the consult.

## 2018-03-27 VITALS — SYSTOLIC BLOOD PRESSURE: 98 MMHG | DIASTOLIC BLOOD PRESSURE: 56 MMHG | TEMPERATURE: 99.5 F

## 2018-03-27 LAB
ALBUMIN SERPL BCG-MCNC: 1.6 G/DL (ref 3.5–5)
ALP SERPL-CCNC: 961 U/L (ref 40–150)
ALT SERPL W P-5'-P-CCNC: 110 U/L (ref 8–55)
ANION GAP SERPL CALC-SCNC: 8 MMOL/L (ref 10–20)
APTT PPP: 33.8 SEC (ref 22.9–36.1)
AST SERPL-CCNC: 261 U/L (ref 5–34)
BASOPHILS # BLD AUTO: 0.1 THOU/UL (ref 0–0.2)
BASOPHILS NFR BLD AUTO: 0.5 % (ref 0–1)
BILIRUB SERPL-MCNC: 6.5 MG/DL (ref 0.2–1.2)
BUN SERPL-MCNC: 10 MG/DL (ref 7–18.7)
CALCIUM SERPL-MCNC: 6.8 MG/DL (ref 7.8–10.44)
CHLORIDE SERPL-SCNC: 95 MMOL/L (ref 98–107)
CO2 SERPL-SCNC: 25 MMOL/L (ref 22–29)
CREAT CL PREDICTED SERPL C-G-VRATE: 223 ML/MIN (ref 70–130)
EOSINOPHIL # BLD AUTO: 0.6 THOU/UL (ref 0–0.7)
EOSINOPHIL NFR BLD AUTO: 5.4 % (ref 0–10)
GLOBULIN SER CALC-MCNC: 2.5 G/DL (ref 2.4–3.5)
GLUCOSE SERPL-MCNC: 114 MG/DL (ref 70–105)
HGB BLD-MCNC: 8.7 G/DL (ref 12–16)
INR PPP: 1.4
LYMPHOCYTES # BLD: 2.1 THOU/UL (ref 1.2–3.4)
LYMPHOCYTES NFR BLD AUTO: 19.8 % (ref 21–51)
MAGNESIUM SERPL-MCNC: 1.9 MG/DL (ref 1.6–2.6)
MCH RBC QN AUTO: 30.4 PG (ref 27–31)
MCV RBC AUTO: 92.6 FL (ref 81–99)
MONOCYTES # BLD AUTO: 0.8 THOU/UL (ref 0.11–0.59)
MONOCYTES NFR BLD AUTO: 7.1 % (ref 0–10)
NEUTROPHILS # BLD AUTO: 7 THOU/UL (ref 1.4–6.5)
NEUTROPHILS NFR BLD AUTO: 67.1 % (ref 42–75)
PLATELET # BLD AUTO: 75 THOU/UL (ref 130–400)
POTASSIUM SERPL-SCNC: 3.1 MMOL/L (ref 3.5–5.1)
PROTHROMBIN TIME: 17.1 SEC (ref 12–14.7)
RBC # BLD AUTO: 2.87 MILL/UL (ref 4.2–5.4)
SODIUM SERPL-SCNC: 125 MMOL/L (ref 136–145)
WBC # BLD AUTO: 10.4 THOU/UL (ref 4.8–10.8)

## 2018-03-27 PROCEDURE — BW40ZZZ ULTRASONOGRAPHY OF ABDOMEN: ICD-10-PCS | Performed by: RADIOLOGY

## 2018-03-27 PROCEDURE — 0W9G3ZZ DRAINAGE OF PERITONEAL CAVITY, PERCUTANEOUS APPROACH: ICD-10-PCS | Performed by: RADIOLOGY

## 2018-03-27 NOTE — DIS
DATE OF DISCHARGE:  03/27/2018

 

DISCHARGE DISPOSITION:  Home.

 

FOLLOWUP:  Follow up with primary care physician, Dr. Edwige Cary, in 1 week.  Follow up with Oncolog
y, Dr. Murphy, as scheduled.

 

ALLERGIES:  No known drug allergies.

 

The patient was seen and examined on the day of discharge.  Denies any new complaints.  No chest pain
, shortness of breath, or palpitations.

 

BRIEF HOSPITAL COURSE:  The patient is a 32-year-old white female with stage IV breast cancer with me
tastasis, currently under hospice, presented to the hospital as a direct admit for blood transfusion 
as well as paracentesis.  She received 2 units of PRBC.  Post-transfusion, her H&H was 8.7.  Due to s
ymptomatic ascites, she underwent paracentesis draining approximately 2 liters of ascitic fluid.  She
 also received one dose of Decadron along with pertuzumab (Perjeta and Herceptin).  She has been aurelio
red by Oncology for discharge.

 

FINAL DIAGNOSES:

1.  Symptomatic ascites, status post paracentesis.

2.  Symptomatic anemia, status post 2 units of PRBC.

3.  Electrolyte abnormalities.  The patient was found to have hyponatremia with sodium 125, hypokalem
ia with potassium 3.1.

4.  Hypomagnesemia with magnesium 1.5 and hypophosphatemia with phosphorus of 2.1.  Electrolytes were
 replaced.

5.  Moderate protein calorie malnutrition.

6.  Abnormal liver function tests due to malignancy.

7.  Repeat LFTs as outpatient is recommended.  Primary care physician advised to follow.

 

DISCHARGE MEDICATIONS:  Potassium chloride 20 mEq daily for one week.

 

Base met after 1 week is recommended.  Primary care physician is advised to follow.

## 2018-03-27 NOTE — ULT
ULTRASOUND-GUIDED PARACENTESIS:

 

03/27/2018

 

HISTORY:

Symptomatic ascites.

 

COMPARISON:

None.

 

FINDINGS:

Informed consent obtained prior to the procedure.  Pre-procedural imaging demonstrates an accessible 
pocket of ascites within the right lower quadrant.  The skin overlying this region was prepped and dr
aped in the normal sterile fashion and anesthetized with 1% buffered Lidocaine.

 

With direct sonographic guidance, a 5 Hungarian Yueh needle was advanced into the ascites in the right l
ower quadrant and removal of the stylet yielded yellow fluid, and 2 L were removed.  The patient karole
rated the procedure well.

 

IMPRESSION:

Successful ultrasound-guided paracentesis, yielding 2 L of yellow fluid.

 

POS: [facility]

## 2018-03-27 NOTE — ULT
LIMITED ABDOMINAL ULTRASOUND:

 

DATE: 3/27/18.

 

COMPARISON: 

None.

 

HISTORY: 

Evaluate ascites volume for possible therapeutic paracentesis.

 

TECHNIQUE: 

Multiplanar, gray scale sonographic imaging of the abdomen obtained for an ascites search.

 

FINDINGS: 

The liver is markedly heterogeneous and irregular with numerous hypoechoic lesions, consistent with e
xtensive hepatic metastatic disease.  There is small volume free fluid in the right upper quadrant, r
ight lower quadrant, left upper quadrant, and left lower quadrant.  

 

IMPRESSION: 

Small volume free fluid within the abdomen/pelvis.  Evidence of hepatic metastatic disease.

 

POS: [facility]

## 2018-04-18 ENCOUNTER — HOSPITAL ENCOUNTER (INPATIENT)
Dept: HOSPITAL 92 - ERS | Age: 33
LOS: 3 days | Discharge: HOSPICE | DRG: 597 | End: 2018-04-21
Attending: INTERNAL MEDICINE | Admitting: INTERNAL MEDICINE
Payer: COMMERCIAL

## 2018-04-18 VITALS — BODY MASS INDEX: 32.7 KG/M2

## 2018-04-18 DIAGNOSIS — G93.1: ICD-10-CM

## 2018-04-18 DIAGNOSIS — Z17.0: ICD-10-CM

## 2018-04-18 DIAGNOSIS — I95.9: ICD-10-CM

## 2018-04-18 DIAGNOSIS — E88.09: ICD-10-CM

## 2018-04-18 DIAGNOSIS — R94.5: ICD-10-CM

## 2018-04-18 DIAGNOSIS — I46.8: ICD-10-CM

## 2018-04-18 DIAGNOSIS — C79.51: ICD-10-CM

## 2018-04-18 DIAGNOSIS — C50.912: Primary | ICD-10-CM

## 2018-04-18 DIAGNOSIS — J91.0: ICD-10-CM

## 2018-04-18 DIAGNOSIS — R18.8: ICD-10-CM

## 2018-04-18 LAB
ALBUMIN SERPL BCG-MCNC: 1.2 G/DL (ref 3.5–5)
ALP SERPL-CCNC: 343 U/L (ref 40–150)
ALT SERPL W P-5'-P-CCNC: 23 U/L (ref 8–55)
ANALYZER IN CARDIO: (no result)
ANION GAP SERPL CALC-SCNC: 16 MMOL/L (ref 10–20)
ANISOCYTOSIS BLD QL SMEAR: (no result) (100X)
APTT PPP: 49.8 SEC (ref 22.9–36.1)
AST SERPL-CCNC: 77 U/L (ref 5–34)
BASE EXCESS STD BLDA CALC-SCNC: -1.6 MEQ/L
BILIRUB SERPL-MCNC: 3.7 MG/DL (ref 0.2–1.2)
BUN SERPL-MCNC: 10 MG/DL (ref 7–18.7)
CA-I BLDA-SCNC: 1 MMOL/L (ref 1.12–1.3)
CALCIUM SERPL-MCNC: 6.9 MG/DL (ref 7.8–10.44)
CHLORIDE SERPL-SCNC: 99 MMOL/L (ref 98–107)
CK MB SERPL-MCNC: 2.6 NG/ML (ref 0–6.6)
CK SERPL-CCNC: 181 U/L (ref 29–168)
CO2 SERPL-SCNC: 21 MMOL/L (ref 22–29)
CREAT CL PREDICTED SERPL C-G-VRATE: 0 ML/MIN (ref 70–130)
CRYSTAL-AUWI FLAG: 0 (ref 0–15)
GLOBULIN SER CALC-MCNC: 2.7 G/DL (ref 2.4–3.5)
GLUCOSE SERPL-MCNC: 113 MG/DL (ref 70–105)
HCO3 BLDA-SCNC: 22.3 MEQ/L (ref 22–26)
HCT VFR BLDA CALC: 29.3 % (ref 36–47)
HEV IGM SER QL: 4.3 (ref 0–7.99)
HGB BLD-MCNC: 10.1 G/DL (ref 12–16)
HGB BLDA-MCNC: 8.5 G/DL (ref 12–16)
HYALINE CASTS #/AREA URNS LPF: (no result) LPF
INR PPP: 2.1
MCH RBC QN AUTO: 31.5 PG (ref 27–31)
MCV RBC AUTO: 103 FL (ref 81–99)
MDIFF COMPLETE?: YES
PATHC CAST-AUWI FLAG: 1.16 (ref 0–2.49)
PCO2 BLDA: 34.2 MMHG (ref 35–45)
PH BLDA: 7.43 [PH] (ref 7.35–7.45)
PLATELET # BLD AUTO: 159 THOU/UL (ref 130–400)
PLATELET BLD QL SMEAR: (no result)
PO2 BLDA: 124.6 MMHG (ref 80–100)
POLYCHROMASIA BLD QL SMEAR: (no result) (100X)
POTASSIUM SERPL-SCNC: 3.6 MMOL/L (ref 3.5–5.1)
PREGS CONTROL BACKGROUND?: (no result)
PREGS CONTROL BAR APPEAR?: YES
PROTHROMBIN TIME: 23.9 SEC (ref 12–14.7)
RBC # BLD AUTO: 3.22 MILL/UL (ref 4.2–5.4)
RBC UR QL AUTO: (no result) HPF (ref 0–3)
SODIUM SERPL-SCNC: 132 MMOL/L (ref 136–145)
SP GR UR STRIP: 1.01 (ref 1–1.04)
SPECIMEN DRAWN FROM PATIENT: (no result)
SPERM-AUWI FLAG: 0 (ref 0–9.9)
TROPONIN I SERPL DL<=0.01 NG/ML-MCNC: (no result) NG/ML (ref ?–0.03)
TROPONIN I SERPL DL<=0.01 NG/ML-MCNC: 0.04 NG/ML (ref ?–0.03)
TROPONIN I SERPL DL<=0.01 NG/ML-MCNC: 0.05 NG/ML (ref ?–0.03)
WBC # BLD AUTO: 16.9 THOU/UL (ref 4.8–10.8)
WBC UR QL AUTO: (no result) HPF (ref 0–3)
YEAST-AUWI FLAG: 0 (ref 0–25)

## 2018-04-18 PROCEDURE — 81015 MICROSCOPIC EXAM OF URINE: CPT

## 2018-04-18 PROCEDURE — 84550 ASSAY OF BLOOD/URIC ACID: CPT

## 2018-04-18 PROCEDURE — 94760 N-INVAS EAR/PLS OXIMETRY 1: CPT

## 2018-04-18 PROCEDURE — 84132 ASSAY OF SERUM POTASSIUM: CPT

## 2018-04-18 PROCEDURE — 96368 THER/DIAG CONCURRENT INF: CPT

## 2018-04-18 PROCEDURE — 96375 TX/PRO/DX INJ NEW DRUG ADDON: CPT

## 2018-04-18 PROCEDURE — 84703 CHORIONIC GONADOTROPIN ASSAY: CPT

## 2018-04-18 PROCEDURE — 94002 VENT MGMT INPAT INIT DAY: CPT

## 2018-04-18 PROCEDURE — 82553 CREATINE MB FRACTION: CPT

## 2018-04-18 PROCEDURE — 71045 X-RAY EXAM CHEST 1 VIEW: CPT

## 2018-04-18 PROCEDURE — 5A1945Z RESPIRATORY VENTILATION, 24-96 CONSECUTIVE HOURS: ICD-10-PCS | Performed by: EMERGENCY MEDICINE

## 2018-04-18 PROCEDURE — 85025 COMPLETE CBC W/AUTO DIFF WBC: CPT

## 2018-04-18 PROCEDURE — P9047 ALBUMIN (HUMAN), 25%, 50ML: HCPCS

## 2018-04-18 PROCEDURE — 87086 URINE CULTURE/COLONY COUNT: CPT

## 2018-04-18 PROCEDURE — 96365 THER/PROPH/DIAG IV INF INIT: CPT

## 2018-04-18 PROCEDURE — 84484 ASSAY OF TROPONIN QUANT: CPT

## 2018-04-18 PROCEDURE — 85610 PROTHROMBIN TIME: CPT

## 2018-04-18 PROCEDURE — 93005 ELECTROCARDIOGRAM TRACING: CPT

## 2018-04-18 PROCEDURE — 96374 THER/PROPH/DIAG INJ IV PUSH: CPT

## 2018-04-18 PROCEDURE — 81003 URINALYSIS AUTO W/O SCOPE: CPT

## 2018-04-18 PROCEDURE — 83735 ASSAY OF MAGNESIUM: CPT

## 2018-04-18 PROCEDURE — 82805 BLOOD GASES W/O2 SATURATION: CPT

## 2018-04-18 PROCEDURE — 71275 CT ANGIOGRAPHY CHEST: CPT

## 2018-04-18 PROCEDURE — 70470 CT HEAD/BRAIN W/O & W/DYE: CPT

## 2018-04-18 PROCEDURE — 82248 BILIRUBIN DIRECT: CPT

## 2018-04-18 PROCEDURE — 80048 BASIC METABOLIC PNL TOTAL CA: CPT

## 2018-04-18 PROCEDURE — 96376 TX/PRO/DX INJ SAME DRUG ADON: CPT

## 2018-04-18 PROCEDURE — 94003 VENT MGMT INPAT SUBQ DAY: CPT

## 2018-04-18 PROCEDURE — 36415 COLL VENOUS BLD VENIPUNCTURE: CPT

## 2018-04-18 PROCEDURE — 85730 THROMBOPLASTIN TIME PARTIAL: CPT

## 2018-04-18 PROCEDURE — 93306 TTE W/DOPPLER COMPLETE: CPT

## 2018-04-18 PROCEDURE — 87040 BLOOD CULTURE FOR BACTERIA: CPT

## 2018-04-18 PROCEDURE — 02HV33Z INSERTION OF INFUSION DEVICE INTO SUPERIOR VENA CAVA, PERCUTANEOUS APPROACH: ICD-10-PCS | Performed by: EMERGENCY MEDICINE

## 2018-04-18 PROCEDURE — 84100 ASSAY OF PHOSPHORUS: CPT

## 2018-04-18 PROCEDURE — C9113 INJ PANTOPRAZOLE SODIUM, VIA: HCPCS

## 2018-04-18 PROCEDURE — 96361 HYDRATE IV INFUSION ADD-ON: CPT

## 2018-04-18 PROCEDURE — 82550 ASSAY OF CK (CPK): CPT

## 2018-04-18 PROCEDURE — 80053 COMPREHEN METABOLIC PANEL: CPT

## 2018-04-18 PROCEDURE — 83615 LACTATE (LD) (LDH) ENZYME: CPT

## 2018-04-18 PROCEDURE — 80202 ASSAY OF VANCOMYCIN: CPT

## 2018-04-18 PROCEDURE — B548ZZA ULTRASONOGRAPHY OF SUPERIOR VENA CAVA, GUIDANCE: ICD-10-PCS | Performed by: EMERGENCY MEDICINE

## 2018-04-18 PROCEDURE — 96366 THER/PROPH/DIAG IV INF ADDON: CPT

## 2018-04-18 PROCEDURE — 36556 INSERT NON-TUNNEL CV CATH: CPT

## 2018-04-18 RX ADMIN — VANCOMYCIN HYDROCHLORIDE SCH MLS: 1 INJECTION, SOLUTION INTRAVENOUS at 21:17

## 2018-04-18 RX ADMIN — Medication PRN MLS: at 20:30

## 2018-04-18 NOTE — CON
DATE OF CONSULTATION:  04/18/2018

 

HISTORY OF PRESENT ILLNESS:  Ms. Moran is a 32-year-old female who is 9 weeks postpartum and approxim
ately 8 weeks ago was diagnosed with metastatic HER-2 positive invasive ductal carcinoma.  She was re
cently on hospice after receiving 2 cycles of treatment and developing liver failure as well as a coa
gulopathy, malignant ascites and failure to thrive.  She was taken off hospice this morning in order 
to try more treatment because she had stabilized and while she was in my chemotherapy getting ready f
or the infusion.  She had a cardiopulmonary arrest which included a pulseless arrest.  CPR was initia
shyanne and the pulse was obtained only with CPR.  EMS was on the scene quickly and also could not find a
 pulse and in fact commenced with CPR as well as Ambu bag and eventually intubation.  She was brought
 to the emergency room and no pulmonary embolism was seen on CAT scan.  She does have a very large le
ft-sided pleural effusion which possibly could have contributed to the pulseless electrical activity 
arrest.  She has now stabilized in the ICU on a Levophed drip as well as being intubated and we are c
onsulted.

 

PAST MEDICAL HISTORY:  Metastatic breast cancer.

 

CURRENT MEDICATIONS:

1.  Tylenol p.r.n.

2.  Lovenox 40 mg subcu q. day.

3.  Pepcid 20 mg IV q.12 hours.

4.  Fentanyl drip.

5.  Midazolam drip.

6.  Levophed drip.

7.  Zofran 4 mg IV q.6 hours p.r.n.

8.  Zosyn 3.375 grams IV q.6 hours.

9.  Vancomycin 1 gram IV q.12 hours.

 

HOME MEDICATIONS:

1.  Torsemide 10 mg p.o. q. day.

2.  Potassium chloride.

3.  Lactulose p.r.n.

4.  Pepcid 20 mg p.o. b.i.d.

5.  Colace 100 mg p.o. q. day.

 

ALLERGIES:  No known drug allergies.

 

SOCIAL HISTORY:  She lives in [place] with her  who is quite supportive and she has th
ree young children.  Her mother and mother-in-law all here and quite supportive.  She denies tobacco 
or alcohol use.

 

FAMILY HISTORY:  Negative for breast or ovarian malignancy.

 

REVIEW OF SYSTEMS:  Unobtainable secondary to her being intubated.  However, she has chronic lower ex
tremity edema as well as chronic fatigue and weakness.

 

PHYSICAL EXAMINATION:

VITAL SIGNS:  She is afebrile, pulse 112, blood pressure 115/71 on Levophed drip, 100% on the ventila
tor.

GENERAL:  She is sedated and unresponsive.

NECK:  Supple, without lymphadenopathy.

CARDIOVASCULAR:  Regular rhythm.

LUNGS:  Decreased breath sounds in the bases bilaterally, worse on the left.

ABDOMEN:  Distended with ascites and obvious dullness in the flanks.

EXTREMITIES:  2+ pitting edema bilaterally.

 

LABORATORY DATA:  White blood cell count 16.9, hemoglobin 10.1, platelets 159.  INR 2.1.  Sodium 132,
 potassium 3.6, chloride 99, CO2 of 21, BUN 10, creatinine 0.6, glucose 113, calcium 6.9, total bilir
ubin 3.7, AST 77, ALT 23, alkaline phosphatase 343, creatine kinase 181.  Troponin I less than 0.01, 
total protein 3.9, albumin 1.2.  CT angiogram done in the emergency room showed no evidence for centr
al pulmonary artery thrombosis, very large left pleural effusion and moderate right-sided pleural eff
usion with bilateral atelectasis.  There are extensive bone metastases itself, very extensive liver m
etastases and evidence for ascites.  There is no pericardial effusion.

 

ASSESSMENT:  Ms. Moran is a 32-year-old female:

1.  Metastatic HER-2 positive invasive ductal carcinoma who is on hospice up until recently.

2.  Malignant ascites as well as full body anasarca and hypoalbuminemia.

3.  Bilateral pleural effusions.

4.  Cardiopulmonary arrest, likely a pulseless electrical activity arrest out of the hospital, now in
tubated with respiratory failure and requiring pressure support.

 

PLAN:  I discussed the diagnosis and prognosis again with the patient's  and family.  They und
erstand that would be reasonable in this situation her to be a DNR, but that otherwise I think we cheri
uld give her a day or two to recover from this to see if she could possibly recover.  Since there is 
no known etiology for the cardiopulmonary arrest, it is reasonable to give her 24-48 hours to see if 
she can improve and come off the ventilator.  We discussed all this at length.  The  is very i
nterested in thoracentesis because a friend has recommended and we will discuss this further with Pul
monary.

 

An echo has been ordered.

 

We will follow with you.

## 2018-04-18 NOTE — CON
DATE OF CONSULTATION:  04/18/2018

 

HISTORY OF PRESENT ILLNESS:  Lisa Moran is a 32-year-old unfortunate female who was in Dr. Murphy
's office, getting chemotherapy when she suddenly developed a respiratory arrest.

 

She was in pulseless electrical activity mood and AIED was placed in the chest, which read ____.  CPR
 was initiated right away by Dr. Murphy.  911 paramedics were called and she was given 2 amps of epi
nephrine, was intubated and transferred to the ER.

 

She is now intubated on the vent.

 

There are several members present in the ER including hospice group, comfort care group.

 

The patient is under hospice comfort care until recently when she felt somewhat better.  Dr. Murphy 
thought that she could benefit from chemotherapy as some of her liver function profile are apparently
 had improved.

 

She is presently in the ER on a Levophed drip, fentanyl drip and getting Zosyn.

 

An emergent CT angio showed smaller right pleural effusion and noticed PE.  INR was 2.1.  White count
 16,000, H and H is 10 and 30, platelet count is 59.  Her pO2 is 124, pCO2 is 34.743, rate of 20, 60%
.  Renal function is normal.

 

The patient's extensive medical history is well outlined in several of her recent admission.  It is p
ertinent for:

1.  Metastatic breast cancer.

2.  Symptomatic ascites, post-paracentesis multiple times.

3.  Status post transfusion.

4.  Abnormal chest x-ray with pleural effusions, left greater than right.

5.  Multiple abnormal liver function profile.

 

The patient underwent uneventful childbirth on 02/08/2018 and found to have a mass in the left breast
 3 months prior to that and felt it was mastitis unfortunately, had a pain in the back and was found 
to have metastatic disease to the spine at that time.

 

PAST MEDICAL HISTORY:  Otherwise no major medical problems listed as hypertension.

 

PAST SURGICAL HISTORY:  D&C.

 

CHRONIC MEDICATION:  Mainly the discharge medication from home which has included fentanyl, torsemide
, Ativan, lactulose.

 

PHYSICAL EXAMINATION:

HEENT:  Pupils are 2 mm.  She is jaundiced.

VITAL SIGNS:  Blood pressure is 110/80 on Levophed, pulse 100, respiratory rate is 20.

CHEST:  Decreased breath sounds without wheezing.

CARDIAC:  Sinus tachycardia.

ABDOMEN:  Distended with ascites.

EXTREMITIES:  4+ ankle edema.

 

LABORATORY DATA:  White count of 16,000, H and H 10 and 30, platelet count 159.  INR is 2.9.

 

IMPRESSION:

1.  Status post pulseless electrical activity, 2 amps of epinephrine with restoration of pulse and bl
ood pressure, now on Levophed.

2.  Abnormal liver function, prolonged PT/INR.

3.  Massive pleural effusions, left greater than right, but there is a pericardial effusion seen on t
he CT.

4.  Ascites, abnormal liver profile.

 

PLAN:  Most common causes of pulseless electrical activity are obviously PE, myocardial infarction, t
amponade.  Nothing there is obvious at this time.  It is possible that her pulseless electrical activ
ity could be due to the large pleural effusion that she has had even though on the x-ray, it does not
 appear to be substantial.  Discussed with Dr. Mruphy, her primary oncologist.  We have got to trans
lewis to the ICU until all family members arrive.  Thereafter, they will make a decision about continui
ng care versus hospice care, etc.

 

At this time, I am not in favor of tapping the left chest, more than likely this is secondary to her 
large ascitic fluid.

 

Serial exam in the ICU until we make a decision about her long-term care and management.

 

We will follow with ongoing discussed with Dr. Murphy.

 

This is a 45-minute critical care time.

## 2018-04-18 NOTE — CT
CT ANGIOGRAM CHEST INCLUDING 3D RENDERING:

 

History: 

32-year-old female with history of syncope. Difficulty breathing. History of breast cancer with metas
tases. 

 

FINDINGS: 

Contrast bolus concentration is somewhat limited. There is no evidence for central pulmonary embolus.
 The more distal and peripheral branches are less than optimally imaged on this study. There is a hug
e left pleural effusion and a moderate to large right pleural effusion with some bilateral pleural ba
sed parenchymal changes, evidence for bilateral atelectasis in the lower lung zones. There is evidenc
e for diffuse anasarca. There are innumerable lytic bone lesions involving the ribs and spine, eviden
ce for extensive bone metastases. The NG tube and endotracheal tubes are in satisfactory location. Th
ere is hepatomegaly with very extensive liver metastases throughout the entire liver. There is eviden
ce of extensive ascites. No pericardial effusion. 

 

IMPRESSION: 

Limited contrast bolus. No evidence for central pulmonary artery thrombosis. Very large left pleural 
effusion and moderate sized right pleural effusion with bilateral atelectasis. Extensive bone metasta
ses. Very extensive liver metastases. Evidence for ascites. No pericardial effusion. Evidence for reji
sarca. 

 

POS: [facility]

## 2018-04-18 NOTE — HP
PRIMARY CARE PHYSICIAN:  Blank Mckenna M.D.

 

CHIEF COMPLAINT:  Respiratory arrest.

 

HISTORY OF PRESENT ILLNESS:  The history of present illness is taken from discussion with the ER phys
alex as the patient is currently intubated and unable to give a history.  Ms. Moran is an unfortunat
e 32-year-old female who was only recently diagnosed with stage IV metastatic breast carcinoma.  This
 was found after she gave birth to her third child.  The cancer was extremely advanced at the time of
 diagnosis and after discovered she was essentially sent home on hospice.  However, after being on ho
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
terschool activities and she contacted her oncologist, Dr. Murphy, who pleased with her improvement 
that is, said that they would consider starting chemotherapy.  The patient was actually in the office
 today for her evaluation for her initial chemotherapy treatment when she suddenly gasped and said th
at she was having shortness of breath and then immediately went into pulseless electrical activity an
d was coded.  CPR was done in the office and she was intubated and then transferred to our emergency 
room for evaluation.  In the ER, there was concern for possible pulmonary embolism; however, CT scan 
of the chest or CTA was negative for pulmonary embolism; however, it did show that she has a large le
ft pleural effusion.  Her initial troponin was actually negative.  Her EKG was sinus tachycardia and 
there was some evidence of low voltage.  She was also hypotensive and required pressors to support he
r blood pressure.  She is being admitted to the ICU for further evaluation and treatment.

 

REVIEW OF SYSTEMS:  Unobtainable as the patient is unable to speak, now she is intubated, on the vent
ilator.

 

PAST MEDICAL HISTORY:  Taken from the patient's  who is at the bedside and includes stage IV b
reast cancer.

 

PAST SURGICAL HISTORY:  Negative.

 

FAMILY HISTORY:  Significant for colon cancer in her grandfather.

 

SOCIAL HISTORY:  She lives at home with her .  She has 3 children, the youngest of which is ju
st several months old.  She is a nonsmoker, nondrinker.

 

CURRENT MEDICATIONS:  Unobtainable.

 

PHYSICAL EXAMINATION:

VITAL SIGNS:  Her blood pressure initially was 86/46, heart rate 127, respiratory rate of 12, and tem
perature was 97.7.

GENERAL:  She is on the ventilator and is unresponsive.

HEENT:  Her pupils are reactive.

NECK:  There is no adenopathy, was not able to appreciate any bruits.

LUNGS:  Essentially clear.  She did have some decreased breath sounds in the left base.  There were n
o rhonchi.

CARDIOVASCULAR:  She has distant heart tones.  Her heart rate is tachycardic.  I was not able to appr
eciate any murmurs or rubs.

ABDOMEN:  Obese, it is soft, positive bowel sounds.

EXTREMITIES:  She has got massive 2-3+ pitting edema in both of her lower extremities and actually sh
e had kelvin anasarca with 2-4+ pitting edema in both her lower extremities and upper extremities and 
also in the torso.

NEUROLOGIC:  She was moving all extremities prior to this event.

 

LABORATORY RESULTS AND X-RAY FINDINGS:  White blood cell count was 16.9, hemoglobin 10.1, hematocrit 
is 33.1, platelet count is 159.  On her ABG, the pH is 7.43, pCO2 of 34.2, and pO2 was 124.  Sodium 1
32, potassium 3.6, chloride is 99, CO2 is 21, BUN of 10, creatinine of 0.6, glucose is 113.  Her calc
ium level was 6.9.  Albumin is 1.2.  Her alkaline phosphatase was 343.  Troponin was 0.010.  Chest x-
ray demonstrated extensive pleural effusion, in which, by my reading her entire left lung is opacifie
d.  The heart size does appear normal and I do not appreciate any discrete infiltrate and on her EKG 
again it was sinus tachycardia, there was some low voltage, and some nonspecific ST wave changes.

 

ASSESSMENT AND PLAN:  This is a pleasant 32-year-old female who presented to the emergency room after
 suffering a cardiac arrest in the Oncology office.  The original rhythm is reported as being pulsele
ss electrical activity.  She is currently intubated and is on IV pressors for blood pressure support.
  It is currently unclear the precipitating event, which caused the arrest.  However, it is more than
 likely cancer related and that she does have an advanced stage for breast cancer.  Extensive pulmona
ry embolism has essentially been ruled out by CT angiogram of the chest, it does not appear that she 
has had a massive myocardial infarction as her initial troponin is negative and there are no signific
ant ischemic changes on her EKG.  There are no significant electrolyte abnormalities.  Her hemoglobin
 is essentially negative and there was no report of any pneumothorax on CT.  The patient was taken of
f the ventilator and backed for some time without any significant change in her blood pressure.  Ther
efore, it is unlikely that there is any significant barotrauma contributing to the hypotension, but h
as yet to be ruled out is significant pericardial effusion or cardiac tamponade.  There are some poss
ible hints to that with regard to the low voltage on the EKG.  Nevertheless, she will be managed in [facility] ICU.  Critical Care consult will be obtained.  We will get an echocardiogram to assess for possibl
e cardiac tamponade.  She will be placed on empiric IV antibiotics in the event that there is any philippe
dence of sepsis; however, this is less likely.  Consultation will be made with her oncologist with re
drew to her overall prognosis given her advanced breast cancer and depending on the echo and followup
 laboratory results, Cardiology consultation can be entertained.

## 2018-04-18 NOTE — RAD
FRONTAL RADIOGRAPH CHEST:

 

Date: 4-18-18 

 

Comparison: 3-12-18

 

History: Altered mental status, syncope. 

 

FINDINGS: 

There is a new endotracheal tube projects over the tracheal air column, terminating approximately 2.2
 cm above the gee. There is a CT injectable right sided port-a-cath. There is a left sided central
 venous catheter, distal tip overlying the cavoatrial junction. There is a nasogastric tube extending
 into the upper abdomen. 

 

There has been interval marked worsening of aeration within the left lung suggesting a enlarging nons
pecific left pleural effusion and extensive underlying nonspecific air space disease. In addition, th
ere is patchy increased density in the right upper lobe region and increased density in the right pascual
g base. 

 

IMPRESSION: 

1. Lines and tubes as above. 

2. Extensive nonspecific bilateral pleural and parenchymal opacity, left greater than right. Findings
 may signify pulmonary edema, infectious pneumonitis or aspiration.

 

POS: [facility]

## 2018-04-18 NOTE — CT
CT HEAD WITH AND WITHOUT IV CONTRAST:

 

Date: 4-18-18 

 

History: Altered mental status. [facility]. Patient w
as intubated by EMS and brought to the Emergency Department. 

 

Comparison: MRI brain, 2-14-18 as well as on 2-11-18 and CT head on 2-11-18. 

 

FINDINGS: 

The previously seen multifocal patchy opacities in the subcortical white matter within the cerebral h
emispheres bilaterally as well as in the region of the corpus callosum noted on prior study have reso
lved. Again, findings on the prior study were likely related to PRES (posterior reversible encephalop
athy syndrome). There is no evidence of an acute infarction, hemorrhage, mass effect, or midline shif
t. No abnormal areas of enhancement are seen after the administration of intravenous contrast. Ventri
cular system is normal in size, shape, and position. Calvarium structures are intact. No lytic or scl
erotic osseous lesions are seen. 

 

Endotracheal tube and nasogastric tubes are partially visualized. Airfluid level is seen in the right
 maxillary antrum which may be related to the recent intubation. Mastoid air cells are clear. 

 

IMPRESSION: 

1. No acute intracranial abnormality is demonstrated. 

2. No abnormal areas of enhancement are seen within the brain to suggest metastatic disease. MRI woul
d be a more sensitive study for evaluation of small metastatic lesions. 

3. Resolution of the multifocal patchy opacities in the cerebral hemispheres bilaterally. Findings we
re likely related to PRES on prior exam. 

4. Air flow level right maxillary antrum, probably related to intubation.  

 

POS: [facility]

## 2018-04-19 LAB
ANALYZER IN CARDIO: (no result)
ANION GAP SERPL CALC-SCNC: 11 MMOL/L (ref 10–20)
ANISOCYTOSIS BLD QL SMEAR: (no result) (100X)
BASE EXCESS STD BLDA CALC-SCNC: 2.6 MEQ/L
BUN SERPL-MCNC: 11 MG/DL (ref 7–18.7)
CA-I BLDA-SCNC: 1 MMOL/L (ref 1.12–1.3)
CALCIUM SERPL-MCNC: 6.5 MG/DL (ref 7.8–10.44)
CHLORIDE SERPL-SCNC: 101 MMOL/L (ref 98–107)
CO2 SERPL-SCNC: 26 MMOL/L (ref 22–29)
CREAT CL PREDICTED SERPL C-G-VRATE: 219 ML/MIN (ref 70–130)
GLUCOSE SERPL-MCNC: 110 MG/DL (ref 70–105)
HCO3 BLDA-SCNC: 26.4 MEQ/L (ref 22–26)
HCT VFR BLDA CALC: 33.8 % (ref 36–47)
HGB BLD-MCNC: 10.6 G/DL (ref 12–16)
HGB BLDA-MCNC: 9.3 G/DL (ref 12–16)
MACROCYTES BLD QL SMEAR: (no result) (100X)
MAGNESIUM SERPL-MCNC: 1.4 MG/DL (ref 1.6–2.6)
MCH RBC QN AUTO: 30.8 PG (ref 27–31)
MCV RBC AUTO: 98.5 FL (ref 81–99)
MDIFF COMPLETE?: YES
PCO2 BLDA: 37.4 MMHG (ref 35–45)
PH BLDA: 7.47 [PH] (ref 7.35–7.45)
PLATELET # BLD AUTO: 188 THOU/UL (ref 130–400)
PLATELET BLD QL SMEAR: (no result)
PO2 BLDA: 63.9 MMHG (ref 80–100)
POLYCHROMASIA BLD QL SMEAR: (no result) (100X)
POTASSIUM SERPL-SCNC: 2.6 MMOL/L (ref 3.5–5.1)
RBC # BLD AUTO: 3.44 MILL/UL (ref 4.2–5.4)
SODIUM SERPL-SCNC: 135 MMOL/L (ref 136–145)
SPECIMEN DRAWN FROM PATIENT: (no result)
WBC # BLD AUTO: 22.4 THOU/UL (ref 4.8–10.8)

## 2018-04-19 RX ADMIN — POTASSIUM CHLORIDE PRN MLS: 29.8 INJECTION, SOLUTION INTRAVENOUS at 06:10

## 2018-04-19 RX ADMIN — HYDROCORTISONE SODIUM SUCCINATE SCH MG: 100 INJECTION, POWDER, FOR SOLUTION INTRAMUSCULAR; INTRAVENOUS at 14:12

## 2018-04-19 RX ADMIN — Medication PRN MLS: at 19:59

## 2018-04-19 RX ADMIN — Medication PRN MLS: at 06:43

## 2018-04-19 RX ADMIN — VANCOMYCIN HYDROCHLORIDE SCH MLS: 1 INJECTION, SOLUTION INTRAVENOUS at 08:32

## 2018-04-19 RX ADMIN — HYDROCORTISONE SODIUM SUCCINATE SCH MG: 100 INJECTION, POWDER, FOR SOLUTION INTRAMUSCULAR; INTRAVENOUS at 08:32

## 2018-04-19 RX ADMIN — HYDROCORTISONE SODIUM SUCCINATE SCH MG: 100 INJECTION, POWDER, FOR SOLUTION INTRAMUSCULAR; INTRAVENOUS at 20:45

## 2018-04-19 NOTE — RAD
SEMIUPRIGHT PORTABLE CHEST 1 VIEW:

 

HISTORY: 

A 32-year-old female with a history of intubation.

 

COMPARISON: 

4/18/18.

 

FINDINGS: 

NG tube, endotracheal tube, left central venous catheters, and right subclavian catheter injection po
rt are all in place in satisfactory location.  Again noted is a very large left pleural effusion and 
a moderate-sized right pleural effusion with some bilateral vascular congestion.  No pneumothorax.  N
o cardiomegaly.  

 

IMPRESSION: 

Little overall change in the appearance of the chest.  Very large left pleural effusion and moderate 
right pleural effusion.

 

POS: [facility]

## 2018-04-19 NOTE — PDOC.PN
- Subjective


Encounter Start Date: 04/19/18


Encounter Start Time: 09:32





Ms. Moran was seen today in follow-up of acute respiratory arrest. She is 

intubated, and sedation has been decreased. 





- Objective


Resuscitation Status: 


 











Resuscitation Status           FULL:Full Resuscitation














MAR Reviewed: Yes


Vital Signs & Weight: 


 Vital Signs (12 hours)











  Temp Pulse Resp BP


 


 04/19/18 08:00    16 


 


 04/19/18 07:00  98.9 F   


 


 04/19/18 06:58   112 H   103/65


 


 04/19/18 06:00    17 


 


 04/19/18 04:00  98.3 F   14 


 


 04/19/18 02:12   112 H   95/51 L


 


 04/19/18 02:00    14 


 


 04/19/18 00:00  98.9 F   14 


 


 04/18/18 22:00  99.5 F   14 


 


 04/18/18 21:56   112 H   103/54 L








 Most Recent Monitor Data











Heart Rate from ECG            112


 


NIBP                           88/45


 


NIBP BP-Mean                   60


 


Respiration from ECG           15


 


SpO2                           100














I&O: 


 











 04/18/18 04/19/18 04/20/18





 06:59 06:59 06:59


 


Intake Total  604.4 100


 


Output Total  1005 100


 


Balance  -400.6 0











Result Diagrams: 


 04/19/18 05:00





 04/19/18 05:00





Phys Exam





- Physical Examination


HEENT: PERRLA


Icteric


Respiratory: no wheezing, no rhonchi


+ decreased breath sounds at the left base


Cardiovascular: RRR, no significant murmur


Gastrointestinal: soft


2+ pitting edema- throughout





Dx/Plan


(1) Breast cancer, stage 4


Code(s): C50.919 - MALIGNANT NEOPLASM OF UNSP SITE OF UNSPECIFIED FEMALE BREAST

   Status: Acute   





(2) Respiratory arrest


Code(s): R09.2 - RESPIRATORY ARREST   Status: Acute   





(3) Hypotension


Status: Acute   





(4) Pleural effusion, right


Code(s): J90 - PLEURAL EFFUSION, NOT ELSEWHERE CLASSIFIED   Status: Acute   





- Plan





* Respiratory arrest- ? etiology- still awaiting the Echo report to evaluate 

for pericardial effusion, and tamponade


* Hypotension- ? etiology- continue pressor support, and  IV fluids


* Continue empiric antibiotics, and await culture results


* Right Pleural effusion- discussed with Dr. Doshi- will hold off on 

thorocentesis, until anoxic brain injury is ruled out


* Hypokalemia, and hypomagnesemia- replace as per electrolyte protocol.

## 2018-04-20 VITALS — DIASTOLIC BLOOD PRESSURE: 49 MMHG | SYSTOLIC BLOOD PRESSURE: 96 MMHG

## 2018-04-20 LAB
ANION GAP SERPL CALC-SCNC: 9 MMOL/L (ref 10–20)
BASOPHILS # BLD AUTO: 0 THOU/UL (ref 0–0.2)
BASOPHILS NFR BLD AUTO: 0.2 % (ref 0–1)
BUN SERPL-MCNC: 11 MG/DL (ref 7–18.7)
CALCIUM SERPL-MCNC: 7.3 MG/DL (ref 7.8–10.44)
CHLORIDE SERPL-SCNC: 105 MMOL/L (ref 98–107)
CO2 SERPL-SCNC: 27 MMOL/L (ref 22–29)
CREAT CL PREDICTED SERPL C-G-VRATE: 254 ML/MIN (ref 70–130)
EOSINOPHIL # BLD AUTO: 0.1 THOU/UL (ref 0–0.7)
EOSINOPHIL NFR BLD AUTO: 0.5 % (ref 0–10)
GLUCOSE SERPL-MCNC: 115 MG/DL (ref 70–105)
HGB BLD-MCNC: 10.2 G/DL (ref 12–16)
LYMPHOCYTES # BLD: 1.8 THOU/UL (ref 1.2–3.4)
LYMPHOCYTES NFR BLD AUTO: 9.7 % (ref 21–51)
MCH RBC QN AUTO: 31 PG (ref 27–31)
MCV RBC AUTO: 99.5 FL (ref 81–99)
MONOCYTES # BLD AUTO: 0.6 THOU/UL (ref 0.11–0.59)
MONOCYTES NFR BLD AUTO: 3.3 % (ref 0–10)
NEUTROPHILS # BLD AUTO: 15.6 THOU/UL (ref 1.4–6.5)
NEUTROPHILS NFR BLD AUTO: 86.4 % (ref 42–75)
PLATELET # BLD AUTO: 191 THOU/UL (ref 130–400)
POTASSIUM SERPL-SCNC: 2.8 MMOL/L (ref 3.5–5.1)
POTASSIUM SERPL-SCNC: 3 MMOL/L (ref 3.5–5.1)
RBC # BLD AUTO: 3.27 MILL/UL (ref 4.2–5.4)
SODIUM SERPL-SCNC: 138 MMOL/L (ref 136–145)
VANCOMYCIN TROUGH SERPL-MCNC: 34.2 UG/ML
WBC # BLD AUTO: 18 THOU/UL (ref 4.8–10.8)

## 2018-04-20 RX ADMIN — HYDROCORTISONE SODIUM SUCCINATE SCH MG: 100 INJECTION, POWDER, FOR SOLUTION INTRAMUSCULAR; INTRAVENOUS at 02:50

## 2018-04-20 RX ADMIN — POTASSIUM CHLORIDE PRN MLS: 29.8 INJECTION, SOLUTION INTRAVENOUS at 05:57

## 2018-04-20 RX ADMIN — Medication PRN MLS: at 10:43

## 2018-04-20 RX ADMIN — HYDROCORTISONE SODIUM SUCCINATE SCH MG: 100 INJECTION, POWDER, FOR SOLUTION INTRAMUSCULAR; INTRAVENOUS at 22:06

## 2018-04-20 RX ADMIN — HYDROCORTISONE SODIUM SUCCINATE SCH MG: 100 INJECTION, POWDER, FOR SOLUTION INTRAMUSCULAR; INTRAVENOUS at 15:11

## 2018-04-20 RX ADMIN — HYDROCORTISONE SODIUM SUCCINATE SCH MG: 100 INJECTION, POWDER, FOR SOLUTION INTRAMUSCULAR; INTRAVENOUS at 10:43

## 2018-04-20 RX ADMIN — POTASSIUM CHLORIDE PRN MLS: 29.8 INJECTION, SOLUTION INTRAVENOUS at 15:12

## 2018-04-20 NOTE — PQF
CLINICAL DOCUMENTATION IMPROVEMENT CLARIFICATION FORM:  ICD-10 Updated

PLEASE DO AN ADDENDUM TO THE PROGRESS NOTE WITH ANY DOCUMENTATION UPDATES OR 
ADDITIONS AND CARRY THROUGH TO DC SUMMARY.   THANK YOU.



DATE:    4/20/18                                 ATTN:  Dr. Ryan



Please exercise your independent, professional judgment in responding to the 
clarification form. 

Clinical indicators are provided on the bottom of this form for your review



Please check appropriate box(s):



[  ] Hypovolemic Shock     

[  ] Cardiogenic Shock      

[  ] Shock Unspecified

[  ] Other diagnosis ___________

[X  ] Unable to determine



In addition, please specify:

Present on Admission (POA):  [ X ] Yes             [  ] No             [  ] 
Unable to determine



For continuity of documentation, please document condition throughout progress 
notes and discharge summary.  Thank You.



CLINICAL INDICATORS - SIGNS / SYMPTOMS / LABS



H&P:  BP 86/46, 

          PRESENTED TO ER AFTER SUFFERING A CARDIAC ARREST 

          IN THE ONCOLOGY OFFICE.

          ORIGINAL RHYTHM IS REPORTED AS BEING PULSELESS ELECTRICAL ACTIVITY. 

          SHE IS CURRENTLY INTUBATED & IS ON IV PRESSORS FOR BP SUPPORT. 

      

PULM. PN 4/20: REMAINS INTUBATED ON THE VENT. NO SEDATION. ON LEVOPHED



RISKS: 

  H&P: STAGE IV METASTATIC BREAST CARCINOMA.  PRESENTED TO THE ER AFTER 

          SUFFERING A CARDIAC ARREST IN THE ONCOLOGY OFFICE. 

          CHEST XRAY DEMONSTRATED EXTENSIVE PLEURAL EFFUSION. 



TREATMENT:

            H&P:  CURRENTLY INTUBATED 

ORDER 4/18:  LEVOPHED 8MG/0.9% NS IV TITRATE FOR MBP > 60MMH







Thank you,

Marcy



(This form is maintained as a part of the permanent medical record)

 2015 [employer]  All Rights Reserved

Marcy Seals RN, BSN    [email or web address]    Office: [phone]

                                                              

 

[facility]

## 2018-04-20 NOTE — PRG
DATE OF SERVICE:  04/20/2018

 

SUBJECTIVE:  She remains intubated on the vent.  No sedation.  On Levophed.

 

OBJECTIVE:

VITAL SIGNS:  Blood pressure is 93/46, pulse is 108, sats 100%.  She is barely assisting the vent at 
a rate of 11.  I's and O's have been 3522 in and 1050 out.

NEUROLOGICAL EXAM:  Responsive.  Pupils are 2 mm.  Eyes are deviated down.  She is jaundiced.

CHEST:  Reveals bilateral rhonchi or crackles.

CARDIAC:  Normal S1 and S2, no gallops.

ABDOMEN:  Distended with ascites.

EXTREMITIES:  3+ ankle edema.

 

LABORATORY DATA:  Potassium 2.8.  White count 18,000.  Cultures are negative.

 

IMPRESSION:

1.  Status post cardiopulmonary arrest.

2.  Probably anoxic injury.

3.  Extensive metastatic breast cancer involvement of the spine, bone, probably lung.

4.  Massive pleural effusion bilaterally.

 

PLAN:  Discussed with primary care, Oncology,  today regarding ongoing supportive care versus 
extubation and comfort care.

 

She is a DNR.

 

She has been off sedation for 24 hours without much neurological improvement.

 

Prognosis remains poor.

 

One-half-hour critical care.

## 2018-04-20 NOTE — PDOC.PN
- Subjective


Encounter Start Date: 04/20/18


Encounter Start Time: 08:55





Ms. Moran was seen in follow-up of respiratory arrest. She has not made much 

improvement overnight. Her  is at the bedside





- Objective


Resuscitation Status: 


 











Resuscitation Status           DNR:Do Not Resuscitate














MAR Reviewed: Yes


Vital Signs & Weight: 


 Vital Signs (12 hours)











  Temp Pulse Resp BP


 


 04/20/18 08:00    11 L 


 


 04/20/18 07:00  98.9 F   


 


 04/20/18 06:34   123 H   93/48 L


 


 04/20/18 06:00    11 L 


 


 04/20/18 05:00  98.8 F   


 


 04/20/18 04:00    14 


 


 04/20/18 02:00    15 


 


 04/20/18 00:00    14 


 


 04/19/18 23:58  98.6 F   


 


 04/19/18 22:00    11 L 








 Weight











Admit Weight                   196 lb


 


Weight                         196 lb 13.965 oz











 Most Recent Monitor Data











Heart Rate from ECG            108


 


NIBP                           106/62


 


NIBP BP-Mean                   76


 


Respiration from ECG           10


 


SpO2                           100














I&O: 


 











 04/19/18 04/20/18 04/21/18





 06:59 06:59 06:59


 


Intake Total 604.4 3522 100


 


Output Total 1005 1050 150


 


Balance -400.6 2472 -50











Result Diagrams: 


 04/20/18 04:21





 04/20/18 04:21





Phys Exam





- Physical Examination


HEENT: PERRLA


Cardiovascular: RRR, no significant murmur


Gastrointestinal: soft, non-tender, positive bowel sounds


Musculoskeletal: edema present


3+ pitting edema thoughout





Dx/Plan


(1) Breast cancer, stage 4


Code(s): C50.919 - MALIGNANT NEOPLASM OF UNSP SITE OF UNSPECIFIED FEMALE BREAST

   Status: Acute   





(2) Respiratory arrest


Code(s): R09.2 - RESPIRATORY ARREST   Status: Acute   





(3) Hypotension


Status: Acute   





(4) Pleural effusion, right


Code(s): J90 - PLEURAL EFFUSION, NOT ELSEWHERE CLASSIFIED   Status: Acute   





- Plan





* Respiratory Arrest- from PEA from ? etiology as a consequence of Stage 4 

breast cancer


* Case discussed with Dr. Doshi. Her  plans to remove her from the 

ventilator


* Will plan comfort care thereafter .

## 2018-04-20 NOTE — RAD
SINGLE VIEW OF CHEST:

 

Date:  04/20/18 

 

COMPARISON:  

04/19/18. 

 

HISTORY:  

Ventilated patient with respiratory failure. 

 

FINDINGS:

Single view of the chest shows normal sized cardiomediastinal silhouette. There are moderate bilatera
l veil-like opacities which likely represent layering pleural effusions. The lines and tubes are unch
anged in position. 

 

IMPRESSION: 

Stable bilateral pleural effusions. 

 

 

POS: [facility]

## 2018-04-21 ENCOUNTER — HOSPITAL ENCOUNTER (INPATIENT)
Dept: HOSPITAL 92 - T4-A | Age: 33
LOS: 4 days | DRG: 951 | End: 2018-04-25
Attending: INTERNAL MEDICINE | Admitting: INTERNAL MEDICINE
Payer: COMMERCIAL

## 2018-04-21 VITALS — TEMPERATURE: 97.8 F

## 2018-04-21 VITALS — BODY MASS INDEX: 33.5 KG/M2

## 2018-04-21 DIAGNOSIS — I46.9: ICD-10-CM

## 2018-04-21 DIAGNOSIS — Z51.5: Primary | ICD-10-CM

## 2018-04-21 DIAGNOSIS — Z17.0: ICD-10-CM

## 2018-04-21 DIAGNOSIS — C50.919: ICD-10-CM

## 2018-04-21 LAB
ANION GAP SERPL CALC-SCNC: 11 MMOL/L (ref 10–20)
BASOPHILS # BLD AUTO: 0 THOU/UL (ref 0–0.2)
BASOPHILS NFR BLD AUTO: 0.1 % (ref 0–1)
BUN SERPL-MCNC: 12 MG/DL (ref 7–18.7)
CALCIUM SERPL-MCNC: 7.1 MG/DL (ref 7.8–10.44)
CHLORIDE SERPL-SCNC: 106 MMOL/L (ref 98–107)
CO2 SERPL-SCNC: 25 MMOL/L (ref 22–29)
CREAT CL PREDICTED SERPL C-G-VRATE: 254 ML/MIN (ref 70–130)
EOSINOPHIL # BLD AUTO: 0.1 THOU/UL (ref 0–0.7)
EOSINOPHIL NFR BLD AUTO: 0.5 % (ref 0–10)
GLUCOSE SERPL-MCNC: 110 MG/DL (ref 70–105)
HGB BLD-MCNC: 9.8 G/DL (ref 12–16)
LYMPHOCYTES # BLD: 1.8 THOU/UL (ref 1.2–3.4)
LYMPHOCYTES NFR BLD AUTO: 12.7 % (ref 21–51)
MCH RBC QN AUTO: 31 PG (ref 27–31)
MCV RBC AUTO: 101 FL (ref 81–99)
MONOCYTES # BLD AUTO: 0.6 THOU/UL (ref 0.11–0.59)
MONOCYTES NFR BLD AUTO: 4.1 % (ref 0–10)
NEUTROPHILS # BLD AUTO: 11.5 THOU/UL (ref 1.4–6.5)
NEUTROPHILS NFR BLD AUTO: 82.7 % (ref 42–75)
PLATELET # BLD AUTO: 176 THOU/UL (ref 130–400)
POTASSIUM SERPL-SCNC: 3 MMOL/L (ref 3.5–5.1)
RBC # BLD AUTO: 3.15 MILL/UL (ref 4.2–5.4)
SODIUM SERPL-SCNC: 139 MMOL/L (ref 136–145)
WBC # BLD AUTO: 13.9 THOU/UL (ref 4.8–10.8)

## 2018-04-21 PROCEDURE — A4216 STERILE WATER/SALINE, 10 ML: HCPCS

## 2018-04-21 RX ADMIN — POTASSIUM CHLORIDE PRN MLS: 29.8 INJECTION, SOLUTION INTRAVENOUS at 05:35

## 2018-04-21 RX ADMIN — HYDROCORTISONE SODIUM SUCCINATE SCH MG: 100 INJECTION, POWDER, FOR SOLUTION INTRAMUSCULAR; INTRAVENOUS at 02:46

## 2018-04-21 RX ADMIN — HYDROCORTISONE SODIUM SUCCINATE SCH MG: 100 INJECTION, POWDER, FOR SOLUTION INTRAMUSCULAR; INTRAVENOUS at 08:40

## 2018-04-21 NOTE — PRG
DATE OF SERVICE:  04/21/2018

 

SUBJECTIVE:  Lisa was extubated yesterday with the plans going palliative care.  She is still on s
ome Levophed and nursing is in the process of weaning that off.  The patient does not appear to be in
 any distress, but does appear to be in some pain.

 

OBJECTIVE:

VITAL SIGNS:  Her temperature is 97.8, pulse 126, blood pressure 102/58.  A 24-hour intake 2444, outp
ut 824.

HEENT:  Remarkable for alopecia.

NECK:  No JVD.

LUNGS:  Distant but clear breath sounds.

CARDIOVASCULAR:  S1, S2 regular.

ABDOMEN:  Distended, slightly tender to palpation.

EXTREMITIES:  Edematous.

 

Her chest x-ray shows bilateral infiltrates.

 

White blood cell count 13.9, hematocrit 31.6, platelet count 176.  Sodium 139, potassium 3.0, chlorid
e 106, CO2 25, BUN 12, creatinine 0.4, glucose 110.

 

ASSESSMENT:

1.  Metastatic breast cancer.

2.  Status post cardiopulmonary arrest.

3.  Anoxic brain injury.

 

PLAN:

1.  Wean off the Levophed.

2.  Discontinue all laboratory and x-rays.

3.  Transfer to Oncology for further palliative care.

4.  I have started the fentanyl patch for the patient's comfort as she was using this at home.

5.  I spoke with family at the bedside.

## 2018-04-21 NOTE — RAD
FRONTAL RADIOGRAPH CHEST:

 

Date:  04/21/18 

 

COMPARISON:  

04/20/18. 

 

HISTORY:  

CCU patient. 

 

FINDINGS:

Endotracheal tube and nasogastric tube  have been removed since the prior exam. Stable CT injectable 
Port-A-Cath on the right. Stable left-sided vascular catheter. 

 

Stable extensive air space disease in bilateral perihilar regions and both lung bases with prominent 
bilateral pleural effusions noted. 

 

IMPRESSION: 

Extensive air space disease and bilateral pleural effusions are unchanged. Continued follow-up advise
yoli. 

 

 

POS: [facility]

## 2018-04-21 NOTE — PDOC.PN
- Subjective


Encounter Start Date: 04/21/18


Encounter Start Time: 08:58


-: non-verbal





Ms. Moran was seen today in follow-up. She has been extubated, and she is 

awake. She is not able to speak. She did lightly squeeze my hand on command.





- Objective


Resuscitation Status: 


 











Resuscitation Status           DNR:Do Not Resuscitate














MAR Reviewed: Yes


Vital Signs & Weight: 


 Vital Signs (12 hours)











  Temp


 


 04/21/18 04:00  97.8 F


 


 04/21/18 00:00  97.8 F








 Weight











Admit Weight                   196 lb


 


Weight                         196 lb 13.965 oz











 Most Recent Monitor Data











Heart Rate from ECG            126


 


NIBP                           102/58


 


NIBP BP-Mean                   78


 


Respiration from ECG           20


 


SpO2                           95














I&O: 


 











 04/20/18 04/21/18 04/22/18





 06:59 06:59 06:59


 


Intake Total 3522 2444.3 100


 


Output Total 1050 824 50


 


Balance 2472 1620.3 50











Result Diagrams: 


 04/21/18 04:35





 04/21/18 04:35





Phys Exam





- Physical Examination


HEENT: PERRLA


Respiratory: no wheezing, no rales, no rhonchi, clear to auscultation bilateral


Cardiovascular: RRR, no significant murmur, no rub


Gastrointestinal: soft, non-tender, positive bowel sounds


Musculoskeletal: edema present


2+ pitting edema





Dx/Plan


(1) Breast cancer, stage 4


Code(s): C50.919 - MALIGNANT NEOPLASM OF UNSP SITE OF UNSPECIFIED FEMALE BREAST

   Status: Acute   





(2) Respiratory arrest


Code(s): R09.2 - RESPIRATORY ARREST   Status: Acute   





(3) Hypotension


Status: Acute   





(4) Pleural effusion, right


Code(s): J90 - PLEURAL EFFUSION, NOT ELSEWHERE CLASSIFIED   Status: Acute   





- Plan





* Respiratory arrest- patient has been extubated


* Stage 4 Breast cancer- her condition is terminal, and the plan is to move her 

to In-patient hospice


* Will ensure that her comfort needs are met..

## 2018-04-22 VITALS — TEMPERATURE: 97.6 F

## 2018-04-22 RX ADMIN — Medication SCH ML: at 08:30

## 2018-04-22 RX ADMIN — Medication SCH: at 02:21

## 2018-04-22 RX ADMIN — Medication SCH ML: at 19:44

## 2018-04-22 NOTE — DIS
PRIMARY CARE PHYSICIAN:  Blank Mckenna M.D.

 

DATE OF ADMISSION:  04/18/2018

 

DATE OF DISCHARGE:  04/21/2018

 

DISCHARGE DISPOSITION:  Inpatient hospice.

 

DISCHARGE DIAGNOSES:

1.  Stage IV breast cancer.

2.  Respiratory arrest secondary to pulseless electrical activity.

 

PROCEDURES DONE DURING ADMISSION:  The patient had a CT angiogram of the chest, which demonstrated no
 evidence of pulmonary embolism.  There was a very large pleural effusion and moderate-sized right pl
eural effusion with bilateral atelectasis and extensive bone metastasis, very extensive liver metasta
sis, evidence for ascites and there was no pericardial effusion and there was evidence of anasarca.  
The patient also had an echocardiogram in which the ejection fraction was estimated at 60% to 65%.  T
here was normal left atrial size and a large pleural effusion.

 

ALLERGIES:  No known drug allergies.

 

CODE STATUS:  DNR.

 

HOSPITAL COURSE:  Ms. Moran is a 32-year-old female who was admitted from her oncologist's office aft
er she suffered an acute respiratory arrest.  She was found to be in pulseless electrical activity.  
CPR was started and she was intubated and admitted to the ICU.  Again, the original rhythm was pulsel
ess electrical activity; however, the etiology was not obtained, but likely as a consequence of her a
dvanced breast cancer, pulmonary embolism, pericardial effusion and severe electrolyte abnormalities 
as well as acute MI were all ruled out.  There was no evidence of sepsis.  The patient nevertheless u
nfortunately has extremely advanced breast cancer and was unlikely to survive her illness.  This info
rmation was given to the patient's family and  and the decision was made to take her off the v
entilator and she was then transferred to inpatient hospice on 04/21/2018.

## 2018-04-23 RX ADMIN — Medication SCH ML: at 07:22

## 2018-04-23 RX ADMIN — Medication SCH ML: at 20:23

## 2018-04-24 RX ADMIN — Medication SCH: at 09:19

## 2018-04-24 RX ADMIN — Medication SCH: at 21:21

## 2018-04-25 VITALS — DIASTOLIC BLOOD PRESSURE: 66 MMHG | SYSTOLIC BLOOD PRESSURE: 102 MMHG

## 2018-04-25 RX ADMIN — Medication SCH ML: at 09:54
